# Patient Record
Sex: MALE | Race: BLACK OR AFRICAN AMERICAN
[De-identification: names, ages, dates, MRNs, and addresses within clinical notes are randomized per-mention and may not be internally consistent; named-entity substitution may affect disease eponyms.]

---

## 2019-06-11 ENCOUNTER — HOSPITAL ENCOUNTER (INPATIENT)
Dept: HOSPITAL 72 - EMR | Age: 59
LOS: 3 days | Discharge: NURSING, REHAB OR LONG-TERM CARE | DRG: 602 | End: 2019-06-14
Payer: MEDICARE

## 2019-06-11 VITALS — BODY MASS INDEX: 26.96 KG/M2 | WEIGHT: 182 LBS | HEIGHT: 69 IN

## 2019-06-11 VITALS — SYSTOLIC BLOOD PRESSURE: 166 MMHG | DIASTOLIC BLOOD PRESSURE: 100 MMHG

## 2019-06-11 VITALS — DIASTOLIC BLOOD PRESSURE: 101 MMHG | SYSTOLIC BLOOD PRESSURE: 176 MMHG

## 2019-06-11 VITALS — SYSTOLIC BLOOD PRESSURE: 162 MMHG | DIASTOLIC BLOOD PRESSURE: 100 MMHG

## 2019-06-11 VITALS — DIASTOLIC BLOOD PRESSURE: 94 MMHG | SYSTOLIC BLOOD PRESSURE: 176 MMHG

## 2019-06-11 VITALS — DIASTOLIC BLOOD PRESSURE: 119 MMHG | SYSTOLIC BLOOD PRESSURE: 171 MMHG

## 2019-06-11 VITALS — DIASTOLIC BLOOD PRESSURE: 82 MMHG | SYSTOLIC BLOOD PRESSURE: 146 MMHG

## 2019-06-11 DIAGNOSIS — L03.115: ICD-10-CM

## 2019-06-11 DIAGNOSIS — Z59.0: ICD-10-CM

## 2019-06-11 DIAGNOSIS — E11.21: ICD-10-CM

## 2019-06-11 DIAGNOSIS — I11.0: ICD-10-CM

## 2019-06-11 DIAGNOSIS — F20.9: ICD-10-CM

## 2019-06-11 DIAGNOSIS — L03.116: Primary | ICD-10-CM

## 2019-06-11 DIAGNOSIS — E83.51: ICD-10-CM

## 2019-06-11 DIAGNOSIS — G93.41: ICD-10-CM

## 2019-06-11 DIAGNOSIS — F29: ICD-10-CM

## 2019-06-11 DIAGNOSIS — I51.4: ICD-10-CM

## 2019-06-11 DIAGNOSIS — N40.0: ICD-10-CM

## 2019-06-11 DIAGNOSIS — D50.9: ICD-10-CM

## 2019-06-11 DIAGNOSIS — E83.42: ICD-10-CM

## 2019-06-11 DIAGNOSIS — I21.A1: ICD-10-CM

## 2019-06-11 DIAGNOSIS — N19: ICD-10-CM

## 2019-06-11 DIAGNOSIS — I50.41: ICD-10-CM

## 2019-06-11 LAB
% IRON SATURATION: 14 % (ref 15–50)
ADD MANUAL DIFF: NO
ALBUMIN SERPL-MCNC: 3.1 G/DL (ref 3.4–5)
ALBUMIN/GLOB SERPL: 1.1 {RATIO} (ref 1–2.7)
ALP SERPL-CCNC: 82 U/L (ref 46–116)
ALT SERPL-CCNC: 90 U/L (ref 12–78)
ANION GAP SERPL CALC-SCNC: 8 MMOL/L (ref 5–15)
APPEARANCE UR: CLEAR
APTT BLD: 25 SEC (ref 23–33)
APTT PPP: YELLOW S
AST SERPL-CCNC: 49 U/L (ref 15–37)
BASOPHILS NFR BLD AUTO: 0.8 % (ref 0–2)
BILIRUB SERPL-MCNC: 0.5 MG/DL (ref 0.2–1)
BUN SERPL-MCNC: 31 MG/DL (ref 7–18)
CALCIUM SERPL-MCNC: 8.4 MG/DL (ref 8.5–10.1)
CHLORIDE SERPL-SCNC: 105 MMOL/L (ref 98–107)
CK SERPL-CCNC: 363 U/L (ref 26–308)
CO2 SERPL-SCNC: 25 MMOL/L (ref 21–32)
CREAT SERPL-MCNC: 1.4 MG/DL (ref 0.55–1.3)
EOSINOPHIL NFR BLD AUTO: 0.9 % (ref 0–3)
ERYTHROCYTE [DISTWIDTH] IN BLOOD BY AUTOMATED COUNT: 13.6 % (ref 11.6–14.8)
FERRITIN SERPL-MCNC: 65 NG/ML (ref 8–388)
GLOBULIN SER-MCNC: 2.9 G/DL
GLUCOSE UR STRIP-MCNC: NEGATIVE MG/DL
HCT VFR BLD CALC: 37.1 % (ref 42–52)
HGB BLD-MCNC: 12.2 G/DL (ref 14.2–18)
INR PPP: 1 (ref 0.9–1.1)
IRON SERPL-MCNC: 43 UG/DL (ref 50–175)
KETONES UR QL STRIP: NEGATIVE
LEUKOCYTE ESTERASE UR QL STRIP: NEGATIVE
LYMPHOCYTES NFR BLD AUTO: 13.4 % (ref 20–45)
MCV RBC AUTO: 92 FL (ref 80–99)
MONOCYTES NFR BLD AUTO: 7.5 % (ref 1–10)
NEUTROPHILS NFR BLD AUTO: 77.4 % (ref 45–75)
NITRITE UR QL STRIP: NEGATIVE
PH UR STRIP: 5 [PH] (ref 4.5–8)
PLATELET # BLD: 241 K/UL (ref 150–450)
POTASSIUM SERPL-SCNC: 3.2 MMOL/L (ref 3.5–5.1)
PROT UR QL STRIP: (no result)
RBC # BLD AUTO: 4.04 M/UL (ref 4.7–6.1)
SODIUM SERPL-SCNC: 138 MMOL/L (ref 136–145)
SP GR UR STRIP: 1.02 (ref 1–1.03)
TIBC SERPL-MCNC: 297 UG/DL (ref 250–450)
UNSATURATED IRON BINDING: 254 UG/DL (ref 112–346)
UROBILINOGEN UR-MCNC: NORMAL MG/DL (ref 0–1)
WBC # BLD AUTO: 8.7 K/UL (ref 4.8–10.8)

## 2019-06-11 PROCEDURE — 94664 DEMO&/EVAL PT USE INHALER: CPT

## 2019-06-11 PROCEDURE — 83540 ASSAY OF IRON: CPT

## 2019-06-11 PROCEDURE — 83735 ASSAY OF MAGNESIUM: CPT

## 2019-06-11 PROCEDURE — 86140 C-REACTIVE PROTEIN: CPT

## 2019-06-11 PROCEDURE — 71045 X-RAY EXAM CHEST 1 VIEW: CPT

## 2019-06-11 PROCEDURE — 96376 TX/PRO/DX INJ SAME DRUG ADON: CPT

## 2019-06-11 PROCEDURE — 36415 COLL VENOUS BLD VENIPUNCTURE: CPT

## 2019-06-11 PROCEDURE — 85025 COMPLETE CBC W/AUTO DIFF WBC: CPT

## 2019-06-11 PROCEDURE — 80329 ANALGESICS NON-OPIOID 1 OR 2: CPT

## 2019-06-11 PROCEDURE — 94640 AIRWAY INHALATION TREATMENT: CPT

## 2019-06-11 PROCEDURE — 82977 ASSAY OF GGT: CPT

## 2019-06-11 PROCEDURE — 82607 VITAMIN B-12: CPT

## 2019-06-11 PROCEDURE — 80061 LIPID PANEL: CPT

## 2019-06-11 PROCEDURE — 82728 ASSAY OF FERRITIN: CPT

## 2019-06-11 PROCEDURE — 81001 URINALYSIS AUTO W/SCOPE: CPT

## 2019-06-11 PROCEDURE — 85610 PROTHROMBIN TIME: CPT

## 2019-06-11 PROCEDURE — 85379 FIBRIN DEGRADATION QUANT: CPT

## 2019-06-11 PROCEDURE — 82746 ASSAY OF FOLIC ACID SERUM: CPT

## 2019-06-11 PROCEDURE — 93970 EXTREMITY STUDY: CPT

## 2019-06-11 PROCEDURE — 93925 LOWER EXTREMITY STUDY: CPT

## 2019-06-11 PROCEDURE — 83880 ASSAY OF NATRIURETIC PEPTIDE: CPT

## 2019-06-11 PROCEDURE — 93005 ELECTROCARDIOGRAM TRACING: CPT

## 2019-06-11 PROCEDURE — 70450 CT HEAD/BRAIN W/O DYE: CPT

## 2019-06-11 PROCEDURE — 82140 ASSAY OF AMMONIA: CPT

## 2019-06-11 PROCEDURE — 80053 COMPREHEN METABOLIC PANEL: CPT

## 2019-06-11 PROCEDURE — 84100 ASSAY OF PHOSPHORUS: CPT

## 2019-06-11 PROCEDURE — 84484 ASSAY OF TROPONIN QUANT: CPT

## 2019-06-11 PROCEDURE — 96374 THER/PROPH/DIAG INJ IV PUSH: CPT

## 2019-06-11 PROCEDURE — 83036 HEMOGLOBIN GLYCOSYLATED A1C: CPT

## 2019-06-11 PROCEDURE — 93306 TTE W/DOPPLER COMPLETE: CPT

## 2019-06-11 PROCEDURE — 85730 THROMBOPLASTIN TIME PARTIAL: CPT

## 2019-06-11 PROCEDURE — 80162 ASSAY OF DIGOXIN TOTAL: CPT

## 2019-06-11 PROCEDURE — 99285 EMERGENCY DEPT VISIT HI MDM: CPT

## 2019-06-11 PROCEDURE — 96375 TX/PRO/DX INJ NEW DRUG ADDON: CPT

## 2019-06-11 PROCEDURE — 87081 CULTURE SCREEN ONLY: CPT

## 2019-06-11 PROCEDURE — 84443 ASSAY THYROID STIM HORMONE: CPT

## 2019-06-11 PROCEDURE — 82550 ASSAY OF CK (CPK): CPT

## 2019-06-11 PROCEDURE — 84550 ASSAY OF BLOOD/URIC ACID: CPT

## 2019-06-11 PROCEDURE — 80307 DRUG TEST PRSMV CHEM ANLYZR: CPT

## 2019-06-11 PROCEDURE — 83550 IRON BINDING TEST: CPT

## 2019-06-11 RX ADMIN — TAMSULOSIN HYDROCHLORIDE SCH MG: 0.4 CAPSULE ORAL at 22:16

## 2019-06-11 RX ADMIN — NITROGLYCERIN SCH PATCH: 0.4 PATCH TRANSDERMAL at 20:58

## 2019-06-11 RX ADMIN — IPRATROPIUM BROMIDE AND ALBUTEROL SULFATE PRN ML: .5; 3 SOLUTION RESPIRATORY (INHALATION) at 19:49

## 2019-06-11 SDOH — ECONOMIC STABILITY - HOUSING INSECURITY: HOMELESSNESS: Z59.0

## 2019-06-11 NOTE — NUR
NURSE NOTES:

Patient in bed AAO X1-2 with bouts of confusion, HOB elevated at semi fowlers. No complaints 
of acute pain or discomfort at this time. Kept clean, dry, and comfortable in bed. IV line 
intact and patent SL. Placed on continuous cardiac monitoring per protocol. Offered urinal 
at bedside and changed PRN when soiled. Safety precaution in place; siderails X3 up, call 
light within reach, bed in lowest position, brakes and alarm on at all times. Needs and 
wants anticipated and attended, will continue plan of care and monitor for any changes 
noted.

## 2019-06-11 NOTE — NUR
ED Nurse Note:



g 20 iv resinserted to pt left upper forearm with good blood return and pt able 
to tolerate. will continue to monitor.

## 2019-06-11 NOTE — EMERGENCY ROOM REPORT
History of Present Illness


General


Chief Complaint:  Pain


Source:  Patient


 (Zoltan Palmer)





Present Illness


HPI


58-year-old male with unknown past medical history brought in by the paramedics 

due to screaming of bilateral leg pain in the middle of the street patient is 

homeless.  Patient is having extra movements, screaming, and is not a good 

historian.  Patient replies yes to questions such as pain in which leg and he 

points to left more than right.  Patient denies any other past medical history 

and denies any medication intake.  Denies drug use, alcohol intake.  Patient is 

rating the pain 10 out of 10 with radiation to the calf.  Has not taken any 

medication for pain.  No other past medical history was obtained due to patient 

being a poor historian with possible underlying psychiatric disorder.


 (Zoltan Palmer)


Allergies:  


Coded Allergies:  


     No Known Allergies (Unverified , 2/2/18)





Patient History


Past Medical History:  see triage record


Past Surgical History:  unable to obtain


Pertinent Family History:  unable to obtain


Immunizations:  other - Unknown due to homeless status and patient is not a 

good historian


Reviewed Nursing Documentation:  PMH: Agreed; PSxH: Agreed (Zoltan Palmer)





Nursing Documentation-PMH


Past Medical History:  No History, Except For


History Of Psychiatric Problem:  Yes - Schizo


 (Zoltan Palmer)





Review of Systems


All Other Systems:  negative except mentioned in HPI


 (Zoltan Palmer)





Physical Exam





Vital Signs








  Date Time  Temp Pulse Resp B/P (MAP) Pulse Ox O2 Delivery O2 Flow Rate FiO2


 


6/11/19 12:30 97.2 120 20 158/75 (102) 98 Room Air  








Sp02 EP Interpretation:  reviewed, normal


General Appearance:  moderate distress, other - Patient is very loud moving 

around


Head:  normocephalic, atraumatic


Eyes:  bilateral eye normal inspection, bilateral eye PERRL


ENT:  normal ENT inspection


Neck:  normal inspection, full range of motion, supple


Respiratory:  normal inspection, chest non-tender, lungs clear, no wheezing


Cardiovascular #1:  normal inspection, regular rate, rhythm, no murmur


Cardiovascular #2:  1+ dorsalis pedis (R), 1+ dorsalis pedis (L)


Gastrointestinal:  normal inspection, non tender, soft, no bruit


Rectal:  deferred


Genitourinary:  no CVA tenderness


Neurologic:  normal inspection, alert, oriented x3, responsive


Psychiatric:  normal inspection, judgement/insight normal


Skin:  rash - celulitis both lower extermities


Lymphatic:  normal inspection, no adenopathy


 (Zoltan Palmer)





Medical Decision Making


PA Attestation


All my diagnosis and treatment plans were reviewed ad discussed with my 

supervising physician Dr. Bonilla


 (Zoltan Palmer)


Medicare Attestation


The history of Shiv Grigsby has been reviewed and management options for him 

have been examined and discussed by myself, Ji Celis. I have personally 

examined and interviewed the patient.


I do agree with the work-up and evaluation patient has multiple comorbidities 

and requires further inpatient care








 (Ji Celis DO)


Diagnostic Impression:  


 Primary Impression:  


 CHF (congestive heart failure)


 Additional Impressions:  


 Pleural effusion


 Pedal edema


 Cellulitis


ER Course


58-year-old male with unknown past medical history brought in by the paramedics 

due to screaming of bilateral leg pain in the middle of the street patient is 

homeless.  Patient is having extra movements, screaming, and is not a good 

historian.  Patient replies yes to questions such as pain in which leg and he 

points to left more than right.  Patient denies any other past medical history 

and denies any medication intake.  Denies drug use, alcohol intake.  Patient is 

rating the pain 10 out of 10 with radiation to the calf.  Has not taken any 

medication for pain.  No other past medical history was obtained due to patient 

being a poor historian with possible underlying psychiatric disorder.





Ddx considered but are not limited to: MI, Angina, COPD, GERD, CHF, pleural 

effusion, cellulitis legs














Vital signs: are WNL, pt. is afebrile








 H&PE are most consistent with CHF, pleural effusion, cellulitis legs

















ORDERS: EKG, Chest XR, cardiac labs(troponin, CBC, CMP, lipid, BNP), tox screen

, 














ED INTERVENTIONS: lasix, albuterol/iprathropieum 




















Patient was admited with diagnosis of   CHF and plural effusion     to Dr. Ly       under supervision of : Lalita








pt stable at time of admission


troponin: 0.06, second trop: 0.065, BNP elevated,


 (Zoltan Palmer)





Chest X-Ray Diagnostic Results


Chest X-Ray Diagnostic Results :  


   Chest X-Ray Ordered:  Yes


   # of Views/Limited/Complete:  1 View


   Indication:  Chest Pain


   EP Interpretation:  Yes


   PA Xray:  Interpretation reviewed, by supervising MD, and agrees with 

findings.


   Interpretation:  other - effusion a d CHF


   Impression:  Other - effusion and CHF


   Electronically Signed by:  zoltan wiseman PA-C


 (Zoltan Palmer)





CT/MRI/US Diagnostic Results


CT/MRI/US Diagnostic Results #1:  


   Imaging Test Ordered:  venus US


   Impression


no DVT


CT/MRI/US Diagnostic Results #2:  


   Imaging Test Ordered:  arterial dulpxes


   Impression


Iliac artery stenosis discussed with the radiologist and Dr. Guille Interiano


 (Zoltan Palmer)





Last Vital Signs








  Date Time  Temp Pulse Resp B/P (MAP) Pulse Ox O2 Delivery O2 Flow Rate FiO2


 


6/11/19 12:41 98.1 114 18 146/82 100 Room Air  








 (Zoltan Palmer)


Disposition:  ADMITTED AS INPATIENT


Referrals:  


NOT CHOSEN IPA/MD,REFERRING (PCP)











Zoltan Palmer Jun 11, 2019 13:50


Ji Celis DO Jun 11, 2019 15:16

## 2019-06-11 NOTE — NUR
ED Nurse Note:



recieved pt from fast track for positive troponin, pt initially came to the ed 
for bilateral lower extremity pain and swelling, [facility] duplex,. pt hooked on monitor with bp and hr 
elevted and recorder. will continue to monitor.

## 2019-06-11 NOTE — NUR
-------------------------------------------------------------------------------

            *** Note undone in EDM - 06/11/19 at 1331 by LEILANI ***             

-------------------------------------------------------------------------------

ED Nurse Note:



PT BROUGHT IN TO ER TODAY BY [id number] FROM [facility]RE[address]. AOX4. PT C/O LEFT SIDED LEG 
PAIN, 10/10 X THIS MORNING. PT DENIES ANY RECENT TRAUMA OR INJURY. PT RESTLESS 
AND IRRITATED. CIRCULATION AND SENSATION INTACT, CAP REFILL <3 SECONDS, MUSCLE 
STRENGTH 5/5. PT PRESENTS WITH SWELLING TO LEFT LEG.

## 2019-06-11 NOTE — DIAGNOSTIC IMAGING REPORT
Indication:Leg pain and swelling

 

Technique: Grayscale and duplex Doppler imaging of the veins in both lower

extremities performed in real time utilizing compression and augmentation.

 

Comparison: None

 

Findings:

 

Duplex Doppler interrogation of the veins in both lower extremity is performed from

the common femoral vein to the popliteal vein. Normal venous compressibility

demonstrated throughout. No thrombus identified. Waveform analysis shows good

respiratory phasicity and augmentation.

 

IMPRESSION:

 

No evidence of deep venous thrombosis involving the lower extremities.

## 2019-06-11 NOTE — DIAGNOSTIC IMAGING REPORT
Indication: Dyspnea

 

Comparison:  None

 

A single view chest radiograph was obtained.

 

Findings:

 

Pulmonary vascular congestion demonstrated with cardiomegaly. Bilateral pleural

effusion suspected. Bones are unremarkable.

 

IMPRESSION:

 

CHF and bilateral pleural effusions

## 2019-06-11 NOTE — NUR
ED Nurse Note:



pt was transfered to tele by rn, all belongings endorsed eva sarabia, pt left the 
ed with stable vs.

## 2019-06-11 NOTE — NUR
ED Nurse Note:



pt noted to have expiratory wheezing, justin rodríguez aware and ordered breathing 
treatment. will continue to monitor

## 2019-06-11 NOTE — NUR
NURSE NOTES:

Initiated to re insert new IV access; patient unable to sit still and is uncooperative. CN 
aware and will try again later on. Will continue to monitor

## 2019-06-11 NOTE — NUR
NURSE NOTES:

Patient came on the floor. In RA. AOx2. Denies pain. Belongings checked signed and filed. 
Patient changed into floor gown and cardiac monitor applied. VS recorded. Informed Dr. Kirk. No inpatient document from ER. Bed on lowest position, side rails upx2, brakes 
engaged. Call light within easy reach. Orientation given to patient regarding hospital 
rules.

## 2019-06-11 NOTE — NUR
ED Nurse Note:



PT BROUGHT IN TO ER TODAY BY [id number] FROM [facility]. AOX4. PT C/O BILATERAL LEG 
PAIN, 10/10 X THIS MORNING. PT DENIES ANY RECENT TRAUMA OR INJURY. PT RESTLESS 
AND IRRITATED. CIRCULATION AND SENSATION INTACT, CAP REFILL <3 SECONDS, MUSCLE 
STRENGTH 5/5. PT PRESENTS WITH SWELLING TO BILATERAL LOWER LEGS.

## 2019-06-11 NOTE — DIAGNOSTIC IMAGING REPORT
Indication:Leg pain and swelling

 

Technique: Grayscale and duplex Doppler interrogation of the arteries in both lower

extremities performed in real time.

 

Comparison: None

 

Findings:

 

Right lower extremity

 

Triphasic to biphasic waveforms demonstrated within the visualized right common

femoral artery, superficial femoral artery and popliteal artery. Biphasic to

monophasic waveforms demonstrated within the right posterior tibial artery. Biphasic

waveforms demonstrated within the right dorsalis pedis artery and anterior tibial

artery. No high-grade stenosis identified.

 

Left lower extremity:

 

 Waveform analysis shows abnormal monophasic waveforms some showing tardus parvus

type slow upstroke. This is seen diffusely within the left lower extremity arteries

including common femoral artery, superficial femoral artery popliteal artery and

trifurcation vessels. There is no visualized stenosis or occlusion within these

vessels. However the waveforms suggests upstream stenosis, likely at the level of the

left common or external iliac artery (given that the waveforms in the right lower

extremity runoff are normal). The iliac arteries are not able to be visualized on

this exam.

 

IMPRESSION:

 

High suspicion of significant left iliac artery stenosis given abnormal monophasic

waveforms in the left lower extremity runoff. No visualized high-grade stenosis or

occlusion of the runoff vessels from the left common femoral artery and beyond.

 

No significant stenosis identified from the right common femoral artery to the

trifurcation vessels.

 

Findings discussed with Dr. Ji Celis via telephone

## 2019-06-12 VITALS — SYSTOLIC BLOOD PRESSURE: 156 MMHG | DIASTOLIC BLOOD PRESSURE: 115 MMHG

## 2019-06-12 VITALS — DIASTOLIC BLOOD PRESSURE: 80 MMHG | SYSTOLIC BLOOD PRESSURE: 131 MMHG

## 2019-06-12 VITALS — SYSTOLIC BLOOD PRESSURE: 147 MMHG | DIASTOLIC BLOOD PRESSURE: 123 MMHG

## 2019-06-12 VITALS — SYSTOLIC BLOOD PRESSURE: 147 MMHG | DIASTOLIC BLOOD PRESSURE: 97 MMHG

## 2019-06-12 VITALS — SYSTOLIC BLOOD PRESSURE: 120 MMHG | DIASTOLIC BLOOD PRESSURE: 71 MMHG

## 2019-06-12 VITALS — DIASTOLIC BLOOD PRESSURE: 100 MMHG | SYSTOLIC BLOOD PRESSURE: 152 MMHG

## 2019-06-12 LAB
ADD MANUAL DIFF: NO
ALBUMIN SERPL-MCNC: 2.6 G/DL (ref 3.4–5)
ALBUMIN/GLOB SERPL: 1 {RATIO} (ref 1–2.7)
ALP SERPL-CCNC: 65 U/L (ref 46–116)
ALT SERPL-CCNC: 75 U/L (ref 12–78)
ANION GAP SERPL CALC-SCNC: 9 MMOL/L (ref 5–15)
AST SERPL-CCNC: 42 U/L (ref 15–37)
BASOPHILS NFR BLD AUTO: 1.2 % (ref 0–2)
BILIRUB SERPL-MCNC: 0.3 MG/DL (ref 0.2–1)
BUN SERPL-MCNC: 29 MG/DL (ref 7–18)
CALCIUM SERPL-MCNC: 8.2 MG/DL (ref 8.5–10.1)
CHLORIDE SERPL-SCNC: 110 MMOL/L (ref 98–107)
CHOLEST SERPL-MCNC: 112 MG/DL (ref ?–200)
CK SERPL-CCNC: 387 U/L (ref 26–308)
CO2 SERPL-SCNC: 24 MMOL/L (ref 21–32)
CREAT SERPL-MCNC: 1.4 MG/DL (ref 0.55–1.3)
EOSINOPHIL NFR BLD AUTO: 1.2 % (ref 0–3)
ERYTHROCYTE [DISTWIDTH] IN BLOOD BY AUTOMATED COUNT: 13.6 % (ref 11.6–14.8)
GAMMA GLUTAMYL TRANSPEPTIDASE: 80 U/L (ref 5–85)
GLOBULIN SER-MCNC: 2.6 G/DL
HCT VFR BLD CALC: 33.4 % (ref 42–52)
HDLC SERPL-MCNC: 30 MG/DL (ref 40–60)
HGB BLD-MCNC: 10.8 G/DL (ref 14.2–18)
LYMPHOCYTES NFR BLD AUTO: 13.9 % (ref 20–45)
MCV RBC AUTO: 91 FL (ref 80–99)
MONOCYTES NFR BLD AUTO: 8.2 % (ref 1–10)
NEUTROPHILS NFR BLD AUTO: 75.5 % (ref 45–75)
PHOSPHATE SERPL-MCNC: 3.2 MG/DL (ref 2.5–4.9)
PLATELET # BLD: 199 K/UL (ref 150–450)
POTASSIUM SERPL-SCNC: 3.6 MMOL/L (ref 3.5–5.1)
RBC # BLD AUTO: 3.68 M/UL (ref 4.7–6.1)
SODIUM SERPL-SCNC: 142 MMOL/L (ref 136–145)
TRIGL SERPL-MCNC: 42 MG/DL (ref 30–150)
WBC # BLD AUTO: 7.6 K/UL (ref 4.8–10.8)

## 2019-06-12 RX ADMIN — DOCUSATE SODIUM SCH MG: 100 CAPSULE, LIQUID FILLED ORAL at 18:00

## 2019-06-12 RX ADMIN — IPRATROPIUM BROMIDE AND ALBUTEROL SULFATE PRN ML: .5; 3 SOLUTION RESPIRATORY (INHALATION) at 11:03

## 2019-06-12 RX ADMIN — ISOSORBIDE MONONITRATE SCH MG: 30 TABLET, EXTENDED RELEASE ORAL at 17:11

## 2019-06-12 RX ADMIN — HYDRALAZINE HYDROCHLORIDE SCH MG: 10 TABLET ORAL at 23:34

## 2019-06-12 RX ADMIN — BENZTROPINE MESYLATE SCH MG: 1 TABLET ORAL at 20:19

## 2019-06-12 RX ADMIN — DOCUSATE SODIUM SCH MG: 100 CAPSULE, LIQUID FILLED ORAL at 09:53

## 2019-06-12 RX ADMIN — NITROGLYCERIN SCH PATCH: 0.4 PATCH TRANSDERMAL at 20:20

## 2019-06-12 RX ADMIN — DOCUSATE SODIUM SCH MG: 100 CAPSULE, LIQUID FILLED ORAL at 13:41

## 2019-06-12 RX ADMIN — HYDRALAZINE HYDROCHLORIDE SCH MG: 10 TABLET ORAL at 18:00

## 2019-06-12 RX ADMIN — TAMSULOSIN HYDROCHLORIDE SCH MG: 0.4 CAPSULE ORAL at 20:19

## 2019-06-12 NOTE — CARDIOLOGY REPORT
--------------- APPROVED REPORT --------------





EXAM: Two-dimensional and M-mode echocardiogram with Doppler and color 

Doppler.



INDICATION

Congestive Heart Failure 



M-Mode DIMENSIONS 

IVSd0.9 (0.7-1.1cm)Left Atrium (MM)5.2 (1.6-4.0cm)

LVDd6.0 (3.5-5.6cm)Aortic Root2.7 (2.0-3.7cm)

PWd0.9 (0.7-1.1cm)Aortic Cusp Exc.1.9 (1.5-2.0cm)

IVSs1.1 cm

LVDs5.2 (2.5-4.0cm)

PWs1.1 cm





Mild left ventriuclar enlargement .

Global left ventricular hypokinesis with apical akinesis .

Left ventricular ejection fraction estimated to be  20 %.

Mild left ventricular hypertrophy by 2-D.

Medium size circumferential pleural effuion present .

Large pericardial effusion, without cardiac tamponade.

Mild  left atrial enlargement .

Right ventricular chamber sizes is within normal limits.

Focal aortic valve sclerosis with adequate cusp excursion.

Mildly thickened mitral valve leaflets with normal excursion.

Mild mitral annulus and aortic root calcification.

Pulmonic valve not well visualized.

IVC dilated at 2.5 cm with physiologic collapse suggestive of mildly increased RA 

pressure.



A  color flow and spectral Doppler study was performed and revealed:

No  aortic insufficiency .

Mitral inflow velocities indicates possible pseudo normalization pattern implying 

moderately elevated left atrial pressure (Grade II )

Moderate  mitral regurgitation.

Mild to moderate  tricuspid regurgitation.

Tricuspid  systolic velocities suggests peak right ventricular systolic pressure of  

33mmHg.

## 2019-06-12 NOTE — NUR
CASE MANAGEMENT:REVIEW



58 YR OLD MALE BIBA FROM STREET ~ HOMELESS



CC: BLE PAIN AND SWELLING



SI: BLE CELLULITIS. CHF. 

97.2   120  20  158/75  98% ON RA

K-3.2   BUN+31  CR+1.4   AST/ALT+49 /90   TROPONIN(+)0.060





IS: IV ATIVAN X2

IV LASIX X2

DUONEB HHN

CLONIDINE PO

CHEST XRAY

**: TO TELEMETRY 

DIGOXIN PO

IV LASIX QD

COREG PO Q12

HYDRALAZINE PO Q6

ASA PO QD

VASOTEC PO QD

## 2019-06-12 NOTE — NUR
HAND-OFF: 

Report given to BIB Luo RN. Patient on the chair with no complaints of distress at this 
time. Endorsed plan of care.

## 2019-06-12 NOTE — NUR
NURSE NOTES:

pt. off cardiac monitor- went to assess pt. and found cardiac monitor on floor- pt. refusing 
to wear monitor- explained importance of wearing monitor- pt. continues to refuse to wear 
monitor- notified charge nurse and monitor tech.

## 2019-06-12 NOTE — NUR
HAND-OFF: 

Report given to Diego RN. Pt in stable condition.  refuses to keep cardiac monitor on him.

## 2019-06-12 NOTE — CONSULTATION
History of Present Illness


General


Chief Complaint:  Pain





Present Illness


Allergies:  


Coded Allergies:  


     No Known Allergies (Unverified , 2/2/18)





Medication History


Scheduled


No Known Medications* (NKM - No Known Medications*), 0 ., (Reported)





Scheduled PRN


Ibuprofen* (Motrin*), 600 MG ORAL Q6H PRN for For Pain





Patient History


Healthcare decision maker





Resuscitation status





Advanced Directive on File








Physical Exam





Last 24 Hour Vital Signs








  Date Time  Temp Pulse Resp B/P (MAP) Pulse Ox O2 Delivery O2 Flow Rate FiO2


 


6/12/19 13:44    132/79    


 


6/12/19 11:14  116 20  100 Room Air  21


 


6/12/19 11:00  117 22  100 Room Air  21


 


6/12/19 08:00 98.0 110 21 156/115 (129) 99   


 


6/12/19 06:25    147/97    


 


6/12/19 04:00 98.3 112 22 147/97 (114) 99   


 


6/12/19 00:00 97.1 117 20 152/100 (117) 94   


 


6/11/19 22:16    165/95    


 


6/11/19 21:00      Room Air  


 


6/11/19 20:58    176/101    


 


6/11/19 20:00 97.8 117 22 176/101 (126) 94   


 


6/11/19 20:00  118      


 


6/11/19 20:00    170/101    


 


6/11/19 19:49  103 18  98 Room Air  21


 


6/11/19 18:50    171/119    


 


6/11/19 17:23      Room Air  


 


6/11/19 17:00 97.1 116 20 171/119 (136) 99   


 


6/11/19 16:46 98.1 105 18 162/100 98 Room Air  


 


6/11/19 16:29  105 18 162/100 98 Room Air  











Laboratory Tests








Test


  6/12/19


05:25


 


White Blood Count


  7.6 K/UL


(4.8-10.8)


 


Red Blood Count


  3.68 M/UL


(4.70-6.10)  L


 


Hemoglobin


  10.8 G/DL


(14.2-18.0)  L


 


Hematocrit


  33.4 %


(42.0-52.0)  L


 


Mean Corpuscular Volume 91 FL (80-99)  


 


Mean Corpuscular Hemoglobin


  29.5 PG


(27.0-31.0)


 


Mean Corpuscular Hemoglobin


Concent 32.5 G/DL


(32.0-36.0)


 


Red Cell Distribution Width


  13.6 %


(11.6-14.8)


 


Platelet Count


  199 K/UL


(150-450)


 


Mean Platelet Volume


  6.0 FL


(6.5-10.1)  L


 


Neutrophils (%) (Auto)


  75.5 %


(45.0-75.0)  H


 


Lymphocytes (%) (Auto)


  13.9 %


(20.0-45.0)  L


 


Monocytes (%) (Auto)


  8.2 %


(1.0-10.0)


 


Eosinophils (%) (Auto)


  1.2 %


(0.0-3.0)


 


Basophils (%) (Auto)


  1.2 %


(0.0-2.0)


 


Sodium Level


  142 MMOL/L


(136-145)


 


Potassium Level


  3.6 MMOL/L


(3.5-5.1)


 


Chloride Level


  110 MMOL/L


()  H


 


Carbon Dioxide Level


  24 MMOL/L


(21-32)


 


Anion Gap


  9 mmol/L


(5-15)


 


Blood Urea Nitrogen


  29 mg/dL


(7-18)  H


 


Creatinine


  1.4 MG/DL


(0.55-1.30)  H


 


Estimat Glomerular Filtration


Rate > 60 mL/min


(>60)


 


Glucose Level


  125 MG/DL


()  H


 


Hemoglobin A1c


  7.0 %


(4.3-6.0)  H


 


Uric Acid


  6.9 MG/DL


(2.6-7.2)


 


Calcium Level


  8.2 MG/DL


(8.5-10.1)  L


 


Phosphorus Level


  3.2 MG/DL


(2.5-4.9)


 


Magnesium Level


  1.6 MG/DL


(1.8-2.4)  L


 


Total Bilirubin


  0.3 MG/DL


(0.2-1.0)


 


Gamma Glutamyl Transpeptidase 80 U/L (5-85)  


 


Aspartate Amino Transf


(AST/SGOT) 42 U/L (15-37)


H


 


Alanine Aminotransferase


(ALT/SGPT) 75 U/L (12-78)


 


 


Alkaline Phosphatase


  65 U/L


()


 


Ammonia


  44 umol/L


(11-32)  H


 


Total Creatine Kinase


  387 U/L


()  H


 


Troponin I


  0.090 ng/mL


(0.000-0.056)


 


C-Reactive Protein,


Quantitative < 0.4 mg/dL


(0.00-0.90)


 


Pro-B-Type Natriuretic Peptide


  4113 pg/mL


(0-125)  H


 


Total Protein


  5.2 G/DL


(6.4-8.2)  L


 


Albumin


  2.6 G/DL


(3.4-5.0)  L


 


Globulin 2.6 g/dL  


 


Albumin/Globulin Ratio 1.0 (1.0-2.7)  


 


Triglycerides Level


  42 MG/DL


()


 


Cholesterol Level


  112 MG/DL (<


200)


 


LDL Cholesterol


  73 mg/dL


(<100)


 


HDL Cholesterol


  30 MG/DL


(40-60)  L


 


Cholesterol/HDL Ratio 3.7 (3.3-4.4)  


 


Vitamin B12 Level


  421 PG/ML


(193-986)


 


Folate


  16.7 NG/ML


(8.6-58.9)


 


Thyroid Stimulating Hormone


(TSH) 2.555 uiU/mL


(0.358-3.740)








Height (Feet):  5


Height (Inches):  9.00


Weight (Pounds):  182


Medications





Current Medications








 Medications


  (Trade)  Dose


 Ordered  Sig/Nighat


 Route


 PRN Reason  Start Time


 Stop Time Status Last Admin


Dose Admin


 


 Acetaminophen


  (Tylenol)  650 mg  Q4H  PRN


 ORAL


 Mild Pain/Temp > 100.5  6/11/19 19:07


 7/11/19 19:06  6/12/19 13:41


 


 


 Albuterol/


 Ipratropium


  (Albuterol/


 Ipratropium)  3 ml  Q4H  PRN


 HHN


 Shortness of Breath  6/11/19 19:10


 6/16/19 19:09  6/12/19 11:03


 


 


 Aspirin


  (ASA)  325 mg  DAILY


 ORAL


   6/12/19 15:00


 7/12/19 14:59   


 


 


 Clonidine HCl


  (Catapres tab)  0.2 mg  EVERY 8  HOURS


 ORAL


   6/12/19 14:00


 7/11/19 21:59  6/12/19 13:44


 


 


 Digoxin


  (Lanoxin)  0.25 mg  DAILY


 ORAL


   6/12/19 14:45


 7/12/19 14:44   


 


 


 Docusate Sodium


  (Colace)  100 mg  THREE TIMES A  DAY


 ORAL


   6/12/19 09:00


 7/12/19 08:59  6/12/19 13:41


 


 


 Famotidine


  (Pepcid)  20 mg  Q12HR


 ORAL


   6/11/19 21:00


 7/11/19 20:59  6/12/19 09:53


 


 


 Gabapentin


  (Neurontin)  100 mg  THREE TIMES A  DAY


 ORAL


   6/12/19 14:45


 7/12/19 14:44   


 


 


 Hydralazine HCl


  (Apresoline)  10 mg  Q6HR


 ORAL


   6/12/19 18:00


 7/12/19 17:59   


 


 


 Hydralazine HCl


  (Apresoline)  25 mg  Q4H  PRN


 ORAL


 bp over 160syst  6/11/19 19:03


 7/11/19 19:02   


 


 


 Iron Sucrose 200


 mg/Sodium Chloride  120 ml @ 


 240 mls/hr  ONCE


 IV


   6/12/19 16:00


 6/12/19 18:00   


 


 


 Isosorbide


 Mononitrate


  (Imdur)  30 mg  DAILY


 ORAL


   6/12/19 14:49


 7/12/19 14:48   


 


 


 Nitroglycerin


  (Ntg)  1 patch  Q24H


 TDERMAL


   6/11/19 20:00


 7/11/19 19:59  6/11/19 20:58


 


 


 Potassium Chloride


  (K-Dur)  40 meq  Q12HR


 ORAL


   6/11/19 21:00


 7/11/19 20:59  6/12/19 09:53


 


 


 Risperidone


  (RisperDAL)  2 mg  QHS


 ORAL


   6/11/19 21:00


 7/11/19 20:59  6/11/19 22:16


 


 


 Tamsulosin HCl


  (Flomax)  0.4 mg  BEDTIME


 ORAL


   6/11/19 21:00


 7/11/19 20:59  6/11/19 22:16


 


 


 Tramadol HCl


  (Ultram)  25 mg  Q6H  PRN


 ORAL


 pain 6 and above  6/11/19 19:07


 6/18/19 19:06   


 











Assessment/Plan


Assessment/Plan:


Hematology Consultation





Date patient seen:  Jun 12, 2019


Reason for Hospitalization:  lower extremity cellulitis


RFC: Anemia rolly CASTELLANOS MD: Ji TOMPKINS


This is a 58-year-old male who was brought in by the paramedics due to 

screaming of bilateral leg pain in the middle of the street.  Patient homeless 

and with psych history.  c/o pain in bilateral calfs and tender on palpation.  

concerns for possible cellulitis given skin changes. surgery called to evaluate 

and assist with care. patient seen, chart reviewed, patient examined. since 

admission states better, currently does not need intervention, not to have 

anemia and heme was consulted.





Allergies:  


     No Known Allergies (Unverified , 2/2/18)





Meds


No Known Medications* (NKM - No Known Medications*), 0 ., (Reported)





Scheduled PRN


Ibuprofen* (Motrin*), 600 MG ORAL Q6H PRN for For Pain





Patient History


Limited by:  medical condition


History Provided By:  Patient, Medical Record, PMD


Healthcare decision maker





Resuscitation status





Advanced Directive on File





Past Medical/Surgical History


Past Medical/Surgical History:  


(1) Cellulitis


(2) CHF (congestive heart failure)


(3) Pleural effusion


(4) Pedal edema





ROS:


Constitutional: No fever, no chills, no night sweats, no fatigue


Skin: No rashes, lumps, itchiness, dryness


HEENT: No HA, ear ache, visual changes


Breasts: No lumps, pain, discharge


Pulmonary: No cough, sputum, shortness of breath, coughing up blood


Cardiovascular: No chest pain, tightness, palpitations, syncope, PND


GI: No nausea, vomiting, diarrhea, melena


: No dysuria, frequency, urgency


Musculoskeletal: No joint swelling


Neurologic: No dizziness, fainting, seizures


Psychiatric: No nervousness, stress


Endocrine: No weight change





Physical Exam


General:  alert, cooperative, no distress


Head:  Normocephalic, without obvious abnormality, atraumatic


Eyes:  conjunctivae/corneas clear. PERRL


Throat:  Lips, mucosa, and tongue normal.


Neck:  supple, symmetrical, trachea midline, no adenopathy


Lungs:  clear to auscultation bilaterally


Heart:  regular rate and rhythm, S1, S2 normal


Abdomen:  soft, non-tender. Bowel sounds normal


Extremities:  extremities normal, atraumatic, no cyanosis or edema


Pulses:  2+ and symmetric


Skin:  Skin color, texture, turgor normal. No rashes or lesions


Neurologic:  Grossly normal





Last 24 Hour Vital Signs








  Date Time  Temp Pulse Resp B/P (MAP) Pulse Ox O2 Delivery O2 Flow Rate FiO2


 


6/12/19 13:44    132/79    


 


6/12/19 11:14  116 20  100 Room Air  21


 


6/12/19 11:00  117 22  100 Room Air  21


 


6/12/19 08:00 98.0 110 21 156/115 (129) 99   


 


6/12/19 06:25    147/97    


 


6/12/19 04:00 98.3 112 22 147/97 (114) 99   


 


6/12/19 00:00 97.1 117 20 152/100 (117) 94   


 


6/11/19 22:16    165/95    


 


6/11/19 21:00      Room Air  


 


6/11/19 20:58    176/101    


 


6/11/19 20:00 97.8 117 22 176/101 (126) 94   


 


6/11/19 20:00  118      


 


6/11/19 20:00    170/101    


 


6/11/19 19:49  103 18  98 Room Air  21


 


6/11/19 18:50    171/119    


 


6/11/19 17:23      Room Air  


 


6/11/19 17:00 97.1 116 20 171/119 (136) 99   


 


6/11/19 16:46 98.1 105 18 162/100 98 Room Air  


 


6/11/19 16:29  105 18 162/100 98 Room Air  


 


6/11/19 15:23  110 20 166/100 92 Room Air  











Laboratory Tests








Test


  6/12/19


05:25


 


White Blood Count


  7.6 K/UL


(4.8-10.8)


 


Red Blood Count


  3.68 M/UL


(4.70-6.10)  L


 


Hemoglobin


  10.8 G/DL


(14.2-18.0)  L


 


Hematocrit


  33.4 %


(42.0-52.0)  L


 


Mean Corpuscular Volume 91 FL (80-99)  


 


Mean Corpuscular Hemoglobin


  29.5 PG


(27.0-31.0)


 


Mean Corpuscular Hemoglobin


Concent 32.5 G/DL


(32.0-36.0)


 


Red Cell Distribution Width


  13.6 %


(11.6-14.8)


 


Platelet Count


  199 K/UL


(150-450)


 


Mean Platelet Volume


  6.0 FL


(6.5-10.1)  L


 


Neutrophils (%) (Auto)


  75.5 %


(45.0-75.0)  H


 


Lymphocytes (%) (Auto)


  13.9 %


(20.0-45.0)  L


 


Monocytes (%) (Auto)


  8.2 %


(1.0-10.0)


 


Eosinophils (%) (Auto)


  1.2 %


(0.0-3.0)


 


Basophils (%) (Auto)


  1.2 %


(0.0-2.0)


 


Sodium Level


  142 MMOL/L


(136-145)


 


Potassium Level


  3.6 MMOL/L


(3.5-5.1)


 


Chloride Level


  110 MMOL/L


()  H


 


Carbon Dioxide Level


  24 MMOL/L


(21-32)


 


Anion Gap


  9 mmol/L


(5-15)


 


Blood Urea Nitrogen


  29 mg/dL


(7-18)  H


 


Creatinine


  1.4 MG/DL


(0.55-1.30)  H


 


Estimat Glomerular Filtration


Rate > 60 mL/min


(>60)


 


Glucose Level


  125 MG/DL


()  H


 


Hemoglobin A1c


  7.0 %


(4.3-6.0)  H


 


Uric Acid


  6.9 MG/DL


(2.6-7.2)


 


Calcium Level


  8.2 MG/DL


(8.5-10.1)  L


 


Phosphorus Level


  3.2 MG/DL


(2.5-4.9)


 


Magnesium Level


  1.6 MG/DL


(1.8-2.4)  L


 


Total Bilirubin


  0.3 MG/DL


(0.2-1.0)


 


Gamma Glutamyl Transpeptidase 80 U/L (5-85)  


 


Aspartate Amino Transf


(AST/SGOT) 42 U/L (15-37)


H


 


Alanine Aminotransferase


(ALT/SGPT) 75 U/L (12-78)


 


 


Alkaline Phosphatase


  65 U/L


()


 


Ammonia


  44 umol/L


(11-32)  H


 


Total Creatine Kinase


  387 U/L


()  H


 


Troponin I


  0.090 ng/mL


(0.000-0.056)


 


C-Reactive Protein,


Quantitative < 0.4 mg/dL


(0.00-0.90)


 


Pro-B-Type Natriuretic Peptide


  4113 pg/mL


(0-125)  H


 


Total Protein


  5.2 G/DL


(6.4-8.2)  L


 


Albumin


  2.6 G/DL


(3.4-5.0)  L


 


Globulin 2.6 g/dL  


 


Albumin/Globulin Ratio 1.0 (1.0-2.7)  


 


Triglycerides Level


  42 MG/DL


()


 


Cholesterol Level


  112 MG/DL (<


200)


 


LDL Cholesterol


  73 mg/dL


(<100)


 


HDL Cholesterol


  30 MG/DL


(40-60)  L


 


Cholesterol/HDL Ratio 3.7 (3.3-4.4)  


 


Vitamin B12 Level


  421 PG/ML


(193-986)


 


Folate


  16.7 NG/ML


(8.6-58.9)


 


Thyroid Stimulating Hormone


(TSH) 2.555 uiU/mL


(0.358-3.740)








Height (Feet):  5


Height (Inches):  9.00


Weight (Pounds):  182


Medications





Current Medications








 Medications


  (Trade)  Dose


 Ordered  Sig/Nighat


 Route


 PRN Reason  Start Time


 Stop Time Status Last Admin


Dose Admin


 


 Acetaminophen


  (Tylenol)  650 mg  Q4H  PRN


 ORAL


 Mild Pain/Temp > 100.5  6/11/19 19:07


 7/11/19 19:06  6/12/19 13:41


 


 


 Albuterol/


 Ipratropium


  (Albuterol/


 Ipratropium)  3 ml  Q4H  PRN


 HHN


 Shortness of Breath  6/11/19 19:10


 6/16/19 19:09  6/12/19 11:03


 


 


 Aspirin


  (ASA)  325 mg  DAILY


 ORAL


   6/12/19 15:00


 7/12/19 14:59   


 


 


 Clonidine HCl


  (Catapres tab)  0.2 mg  EVERY 8  HOURS


 ORAL


   6/12/19 14:00


 7/11/19 21:59  6/12/19 13:44


 


 


 Digoxin


  (Lanoxin)  0.25 mg  DAILY


 ORAL


   6/12/19 14:45


 7/12/19 14:44   


 


 


 Docusate Sodium


  (Colace)  100 mg  THREE TIMES A  DAY


 ORAL


   6/12/19 09:00


 7/12/19 08:59  6/12/19 13:41


 


 


 Famotidine


  (Pepcid)  20 mg  Q12HR


 ORAL


   6/11/19 21:00


 7/11/19 20:59  6/12/19 09:53


 


 


 Furosemide


  (Lasix)  20 mg  ONCE


 IV


   6/12/19 14:51


 6/12/19 16:00   


 


 


 Gabapentin


  (Neurontin)  100 mg  THREE TIMES A  DAY


 ORAL


   6/12/19 14:45


 7/12/19 14:44   


 


 


 Hydralazine HCl


  (Apresoline)  10 mg  Q6HR


 ORAL


   6/12/19 18:00


 7/12/19 17:59   


 


 


 Hydralazine HCl


  (Apresoline)  25 mg  Q4H  PRN


 ORAL


 bp over 160syst  6/11/19 19:03


 7/11/19 19:02   


 


 


 Iron Sucrose 200


 mg/Sodium Chloride  120 ml @ 


 240 mls/hr  ONCE


 IV


   6/12/19 16:00


 6/12/19 18:00   


 


 


 Isosorbide


 Mononitrate


  (Imdur)  30 mg  DAILY


 ORAL


   6/12/19 14:49


 7/12/19 14:48   


 


 


 Nitroglycerin


  (Ntg)  1 patch  Q24H


 TDERMAL


   6/11/19 20:00


 7/11/19 19:59  6/11/19 20:58


 


 


 Potassium Chloride


  (K-Dur)  40 meq  Q12HR


 ORAL


   6/11/19 21:00


 7/11/19 20:59  6/12/19 09:53


 


 


 Risperidone


  (RisperDAL)  2 mg  QHS


 ORAL


   6/11/19 21:00


 7/11/19 20:59  6/11/19 22:16


 


 


 Tamsulosin HCl


  (Flomax)  0.4 mg  BEDTIME


 ORAL


   6/11/19 21:00


 7/11/19 20:59  6/11/19 22:16


 


 


 Tramadol HCl


  (Ultram)  25 mg  Q6H  PRN


 ORAL


 pain 6 and above  6/11/19 19:07


 6/18/19 19:06   


 








Assessment and Recs:


# Anemia of iron deficiency due to underlying chronic medical issues, 

multifactorial 


--> Anemia workup has been ordered, rule out gi bleed 


--> No evidence of hemolysis is noted, peripheral smear has been reviewed.


--> Hgb goal >7. Transfuse prn.


--> Iron IV has been started x 5 doses total


--> Medications have been reviewed


--> low threshold for gi evaluation in case has occult +


--> gi eval as required


# Cellulitis of bilateral lower extremities. mild edema. no abscess. was tender 

on admission but now improved.  +psych history.


--> abx as per id


--> surgical recs prn


--> skin protectant to both legs


# CHF with pedal edema


--> as per cards


# Pleural effusion 


--> diuresis on prn lasix


# Pedal edema





The timing of this note does not necessarily reflect the time of the patient 

was seen.





GREATLY APPRECIATE CONSULTATION.











Reginaldo Amado MD Jun 12, 2019 16:13

## 2019-06-12 NOTE — NUR
NURSE NOTES:

Received report from Diego HARTMAN, pt. in room sitting in chair watching television, A/O x's 2- 
able to make needs known, no signs or symptoms of acute cardiac or respiratory distress 
noted, bed in lowest position and call light within easy reach, side rails up x's2 and 
safety brakes engaged, pt. appears to be sating well on room air at 98%- no distress noted, 
pt. has RFA 20G IV intact and patent, safety measures continued, will continue with plan of 
care.

## 2019-06-12 NOTE — CONSULTATION
Consult Note


Consult Note





asked to eval at the request of dr somers





58-year-old male with unknown past medical history brought in by the paramedics 

due to screaming of bilateral leg pain in the middle of the street patient is 

homeless.  Patient is having extra movements, screaming, and is not a good 

historian.  Patient replies yes to questions such as pain in which leg and he 

points to left more than right.  Patient denies any other past medical history 

and denies any medication intake.  Denies drug use, alcohol intake.  Patient is 

rating the pain 10 out of 10 with radiation to the calf.  Has not taken any 

medication for pain.  No other past medical history was obtained due to patient 

being a poor historian with possible underlying psychiatric disorder.





examined


data reviewed


Assessment/Plan





HTN OOC


Proteinuria / HypoAlbuminemia / Edematous state


Cardiomyopathy : Low Ej Fx


Diabetes ; High A1c


CHF


Renal failure


Anemia








plan;





Slow diurese


BP control


BS control


Afterload & Preload reduction


Inotropic Rx


monitor lytes and renal parameters











Scott Mccray MD Jun 12, 2019 14:50

## 2019-06-12 NOTE — NUR
NURSE NOTES:

Received  report from Diego HARTMAN.  Pt sitting in chair, cardiac monitor was put back on because 
he does not like it on.  Pt is not showing any kind of cardiac or respiratory distress at 
this time.  Call light within reach.  Bed locked and in lowest position.   Will continue to 
monitor and follow plan of care.

## 2019-06-12 NOTE — NUR
Social Service Note



Patient with a previous ER visit 2/2018.  Patient cleared by psych and ER MD and returned to 
previous living conditions.  SW met with patient to assess for homelessness.  Patient is 
awake and verbally responsive.  Patient is aware he is in the hospital and stated he had 
someone passing by call 911 because his legs were hurting.  Patient states he has been 
homeless for 10 years.  SW contacted missing person [phone] and patient has not been 
reported missing.  Patient stated he lived in a board and care possibly 4 years ago but then 
his SSI stopped and he ended up on the streets.  Patient appears to be a poor historian and 
provides inconsistent information.  Patient unable to recall his SS#.  Patient unable to 
provided previous living location but states he usually just sleeps on the streets.  Patient 
stated he has a mental health disorder but doesn't recall what is was.  Patient states he 
has prior drug history of smoking crack but hasn't done this in years.  Patient was positive 
for THC.  Patient unable to provide an emergency contact.  Patient stated he doesn't want 
shelter placement.  JAZLYN contacted [facility] to determine if he is receiving case management 
services since he indicated someone was following him because he was "mental".  JAZLYN will 
continue to monitor.  Pending psych consultation.

## 2019-06-12 NOTE — NUR
NURSE NOTES:

Received report from BIB Luo RN. Patient in bed AAO X1-2 with bouts of confusion, HOB 
elevated at semi fowlers. No complaints of acute pain or discomfort at this time. Kept 
clean, dry, and comfortable in bed. IV line intact and patent SL. Placed on continuous 
cardiac monitoring per protocol. Offered urinal at bedside and changed PRN when soiled. 
Safety precaution in place; siderails X3 up, call light within reach, bed in lowest 
position, brakes and alarm on at all times. Needs and wants anticipated and attended, will 
continue plan of care and monitor for any changes noted.

## 2019-06-12 NOTE — CONSULTATION
History of Present Illness


General


Date patient seen:  Jun 12, 2019


Reason for Hospitalization:  lower extremity cellulitis





Present Illness


HPI


This is a 58-year-old male who was brought in by the paramedics due to 

screaming of bilateral leg pain in the middle of the street.  Patient homeless 

and with psych history.  c/o pain in bilateral calfs and tender on palpation.  

concerns for possible cellulitis given skin changes. surgery called to evaluate 

and assist with care. patient seen, chart reviewed, patient examined. since 

admission states better


Allergies:  


Coded Allergies:  


     No Known Allergies (Unverified , 2/2/18)





Medication History


Scheduled


No Known Medications* (NKM - No Known Medications*), 0 ., (Reported)





Scheduled PRN


Ibuprofen* (Motrin*), 600 MG ORAL Q6H PRN for For Pain





Patient History


Limited by:  medical condition


History Provided By:  Patient, Medical Record, PMD


Healthcare decision maker





Resuscitation status





Advanced Directive on File








Past Medical/Surgical History


Past Medical/Surgical History:  


(1) Cellulitis


(2) CHF (congestive heart failure)


(3) Pleural effusion


(4) Pedal edema





Review of Systems


Review of Symptoms


General ROS: no weight loss or fever


Psychological ROS: no depression or mood changes, no memory loss


Ophthalmic ROS: no visual changes or eye irritation


ENT ROS: no nasal congestion, hearing loss, dizziness


Allergy and Immunology ROS: no allergic symptoms or urticaria


Hematological and Lymphatic ROS: no swollen glands, unusual bleeding or bruising


Endocrine ROS: no polyuria, polydipsia, weight changes, temperature intolerance


Respiratory ROS: no cough, shortness of breath, or wheezing


Cardiovascular ROS: no chest pain or dyspnea on exertion


Gastrointestinal ROS: denies abdominal pain, no bright red blood in stool.


Musculoskeletal ROS:  myalgias lower extremities 


Neurological ROS: no TIA or stroke symptoms


Dermatological ROS: no new or changing skin lesions, rashes or pruritis





Physical Exam


Physical Exam


General appearance:  alert, cooperative, no distress, appears stated age


Head:  Normocephalic, without obvious abnormality, atraumatic


Eyes:  conjunctivae/corneas clear. PERRL, EOM's intact. Fundi benign


Throat:  Lips, mucosa, and tongue normal. Teeth and gums normal


Neck:  supple, symmetrical, trachea midline, no adenopathy, thyroid: not 

enlarged, symmetric, no tenderness/mass/nodules, no carotid bruit and no JVD


Lungs:  clear to auscultation bilaterally


Heart:  regular rate and rhythm, S1, S2 normal, no murmur, click, rub or gallop


Abdomen:  soft, non-tender. Bowel sounds normal. No masses,  no organomegaly


Extremities:  extremities normal, atraumatic, no cyanosis or edema


Pulses:  2+ and symmetric


Skin:  Skin color, texture, turgor normal. No rashes or lesions.  skin changes 

chronic to bilateral lower extremities.  no abscess noted.  unlikely 

cellulitis.  


Neurologic:  Grossly normal





Last 24 Hour Vital Signs








  Date Time  Temp Pulse Resp B/P (MAP) Pulse Ox O2 Delivery O2 Flow Rate FiO2


 


6/12/19 13:44    132/79    


 


6/12/19 11:14  116 20  100 Room Air  21


 


6/12/19 11:00  117 22  100 Room Air  21


 


6/12/19 08:00 98.0 110 21 156/115 (129) 99   


 


6/12/19 06:25    147/97    


 


6/12/19 04:00 98.3 112 22 147/97 (114) 99   


 


6/12/19 00:00 97.1 117 20 152/100 (117) 94   


 


6/11/19 22:16    165/95    


 


6/11/19 21:00      Room Air  


 


6/11/19 20:58    176/101    


 


6/11/19 20:00 97.8 117 22 176/101 (126) 94   


 


6/11/19 20:00  118      


 


6/11/19 20:00    170/101    


 


6/11/19 19:49  103 18  98 Room Air  21


 


6/11/19 18:50    171/119    


 


6/11/19 17:23      Room Air  


 


6/11/19 17:00 97.1 116 20 171/119 (136) 99   


 


6/11/19 16:46 98.1 105 18 162/100 98 Room Air  


 


6/11/19 16:29  105 18 162/100 98 Room Air  


 


6/11/19 15:23  110 20 166/100 92 Room Air  











Laboratory Tests








Test


  6/12/19


05:25


 


White Blood Count


  7.6 K/UL


(4.8-10.8)


 


Red Blood Count


  3.68 M/UL


(4.70-6.10)  L


 


Hemoglobin


  10.8 G/DL


(14.2-18.0)  L


 


Hematocrit


  33.4 %


(42.0-52.0)  L


 


Mean Corpuscular Volume 91 FL (80-99)  


 


Mean Corpuscular Hemoglobin


  29.5 PG


(27.0-31.0)


 


Mean Corpuscular Hemoglobin


Concent 32.5 G/DL


(32.0-36.0)


 


Red Cell Distribution Width


  13.6 %


(11.6-14.8)


 


Platelet Count


  199 K/UL


(150-450)


 


Mean Platelet Volume


  6.0 FL


(6.5-10.1)  L


 


Neutrophils (%) (Auto)


  75.5 %


(45.0-75.0)  H


 


Lymphocytes (%) (Auto)


  13.9 %


(20.0-45.0)  L


 


Monocytes (%) (Auto)


  8.2 %


(1.0-10.0)


 


Eosinophils (%) (Auto)


  1.2 %


(0.0-3.0)


 


Basophils (%) (Auto)


  1.2 %


(0.0-2.0)


 


Sodium Level


  142 MMOL/L


(136-145)


 


Potassium Level


  3.6 MMOL/L


(3.5-5.1)


 


Chloride Level


  110 MMOL/L


()  H


 


Carbon Dioxide Level


  24 MMOL/L


(21-32)


 


Anion Gap


  9 mmol/L


(5-15)


 


Blood Urea Nitrogen


  29 mg/dL


(7-18)  H


 


Creatinine


  1.4 MG/DL


(0.55-1.30)  H


 


Estimat Glomerular Filtration


Rate > 60 mL/min


(>60)


 


Glucose Level


  125 MG/DL


()  H


 


Hemoglobin A1c


  7.0 %


(4.3-6.0)  H


 


Uric Acid


  6.9 MG/DL


(2.6-7.2)


 


Calcium Level


  8.2 MG/DL


(8.5-10.1)  L


 


Phosphorus Level


  3.2 MG/DL


(2.5-4.9)


 


Magnesium Level


  1.6 MG/DL


(1.8-2.4)  L


 


Total Bilirubin


  0.3 MG/DL


(0.2-1.0)


 


Gamma Glutamyl Transpeptidase 80 U/L (5-85)  


 


Aspartate Amino Transf


(AST/SGOT) 42 U/L (15-37)


H


 


Alanine Aminotransferase


(ALT/SGPT) 75 U/L (12-78)


 


 


Alkaline Phosphatase


  65 U/L


()


 


Ammonia


  44 umol/L


(11-32)  H


 


Total Creatine Kinase


  387 U/L


()  H


 


Troponin I


  0.090 ng/mL


(0.000-0.056)


 


C-Reactive Protein,


Quantitative < 0.4 mg/dL


(0.00-0.90)


 


Pro-B-Type Natriuretic Peptide


  4113 pg/mL


(0-125)  H


 


Total Protein


  5.2 G/DL


(6.4-8.2)  L


 


Albumin


  2.6 G/DL


(3.4-5.0)  L


 


Globulin 2.6 g/dL  


 


Albumin/Globulin Ratio 1.0 (1.0-2.7)  


 


Triglycerides Level


  42 MG/DL


()


 


Cholesterol Level


  112 MG/DL (<


200)


 


LDL Cholesterol


  73 mg/dL


(<100)


 


HDL Cholesterol


  30 MG/DL


(40-60)  L


 


Cholesterol/HDL Ratio 3.7 (3.3-4.4)  


 


Vitamin B12 Level


  421 PG/ML


(193-986)


 


Folate


  16.7 NG/ML


(8.6-58.9)


 


Thyroid Stimulating Hormone


(TSH) 2.555 uiU/mL


(0.358-3.740)








Height (Feet):  5


Height (Inches):  9.00


Weight (Pounds):  182


Medications





Current Medications








 Medications


  (Trade)  Dose


 Ordered  Sig/Nighat


 Route


 PRN Reason  Start Time


 Stop Time Status Last Admin


Dose Admin


 


 Acetaminophen


  (Tylenol)  650 mg  Q4H  PRN


 ORAL


 Mild Pain/Temp > 100.5  6/11/19 19:07


 7/11/19 19:06  6/12/19 13:41


 


 


 Albuterol/


 Ipratropium


  (Albuterol/


 Ipratropium)  3 ml  Q4H  PRN


 HHN


 Shortness of Breath  6/11/19 19:10


 6/16/19 19:09  6/12/19 11:03


 


 


 Aspirin


  (ASA)  325 mg  DAILY


 ORAL


   6/12/19 15:00


 7/12/19 14:59   


 


 


 Clonidine HCl


  (Catapres tab)  0.2 mg  EVERY 8  HOURS


 ORAL


   6/12/19 14:00


 7/11/19 21:59  6/12/19 13:44


 


 


 Digoxin


  (Lanoxin)  0.25 mg  DAILY


 ORAL


   6/12/19 14:45


 7/12/19 14:44   


 


 


 Docusate Sodium


  (Colace)  100 mg  THREE TIMES A  DAY


 ORAL


   6/12/19 09:00


 7/12/19 08:59  6/12/19 13:41


 


 


 Famotidine


  (Pepcid)  20 mg  Q12HR


 ORAL


   6/11/19 21:00


 7/11/19 20:59  6/12/19 09:53


 


 


 Furosemide


  (Lasix)  20 mg  ONCE


 IV


   6/12/19 14:51


 6/12/19 16:00   


 


 


 Gabapentin


  (Neurontin)  100 mg  THREE TIMES A  DAY


 ORAL


   6/12/19 14:45


 7/12/19 14:44   


 


 


 Hydralazine HCl


  (Apresoline)  10 mg  Q6HR


 ORAL


   6/12/19 18:00


 7/12/19 17:59   


 


 


 Hydralazine HCl


  (Apresoline)  25 mg  Q4H  PRN


 ORAL


 bp over 160syst  6/11/19 19:03


 7/11/19 19:02   


 


 


 Iron Sucrose 200


 mg/Sodium Chloride  120 ml @ 


 240 mls/hr  ONCE


 IV


   6/12/19 16:00


 6/12/19 18:00   


 


 


 Isosorbide


 Mononitrate


  (Imdur)  30 mg  DAILY


 ORAL


   6/12/19 14:49


 7/12/19 14:48   


 


 


 Nitroglycerin


  (Ntg)  1 patch  Q24H


 TDERMAL


   6/11/19 20:00


 7/11/19 19:59  6/11/19 20:58


 


 


 Potassium Chloride


  (K-Dur)  40 meq  Q12HR


 ORAL


   6/11/19 21:00


 7/11/19 20:59  6/12/19 09:53


 


 


 Risperidone


  (RisperDAL)  2 mg  QHS


 ORAL


   6/11/19 21:00


 7/11/19 20:59  6/11/19 22:16


 


 


 Tamsulosin HCl


  (Flomax)  0.4 mg  BEDTIME


 ORAL


   6/11/19 21:00


 7/11/19 20:59  6/11/19 22:16


 


 


 Tramadol HCl


  (Ultram)  25 mg  Q6H  PRN


 ORAL


 pain 6 and above  6/11/19 19:07


 6/18/19 19:06   


 











Assessment/Plan


Problem List:  


(1) Cellulitis


Assessment & Plan:  58M with concerns for cellulitis of bilateral lower 

extremities.  mild edema.  no abscess. was tender on admission but now 

improved.  +psych history.





no acute surgical intervention necessary


will monitor for progression or improvement


clinical exams


Abx as per ID


skin protectant to both legs


keep elevated


likely CHF. 


thank you


ICD Codes:  L03.90 - Cellulitis, unspecified


SNOMED:  908037672


(2) CHF (congestive heart failure)


ICD Codes:  I50.9 - Heart failure, unspecified


SNOMED:  70854192


(3) Pleural effusion


ICD Codes:  J90 - Pleural effusion, not elsewhere classified


SNOMED:  69166873


(4) Pedal edema


ICD Codes:  R60.0 - Localized edema


SNOMED:  102612190











José Miguel Devine Jun 12, 2019 15:04

## 2019-06-12 NOTE — CARDIOLOGY REPORT
--------------- APPROVED REPORT --------------





EKG Measurement

Heart [id number]

OR 122P

[id number]

[id number]

[id number]





Sinus tachycardia

Right axis deviation

Low voltage QRS

Nonspecific T wave abnormality

Abnormal ECG

## 2019-06-12 NOTE — CONSULTATION
History of Present Illness


General


Date patient seen:  Jun 11, 2019


Chief Complaint:  AMS


Referring physician:  Dr. Ly





Present Illness


HPI


 Shiv Grigsby is a 58 year old  male with a PMH of drug abuse, 

smoking, homelessness, hypertension, BPH, GERD, psychosis,


pedal edema, and pleural effusion was [place] to [facility] [place] for AMS and HTN 

following an episode of screaming in the street about his legs hurting. 





Upon arrival to [facility] [place] he was also found to have hypokalemia, and borderline 

troponins. 





Neurology


Allergies:  


Coded Allergies:  


     No Known Allergies (Unverified , 2/2/18)





Medication History


Scheduled


No Known Medications* (NKM - No Known Medications*), 0 ., (Reported)





Scheduled PRN


Ibuprofen* (Motrin*), 600 MG ORAL Q6H PRN for For Pain





Patient History


Limited by:  medical condition


History Provided By:  Medical Record


Healthcare decision maker





Resuscitation status





Advanced Directive on File








Past Medical/Surgical History


Past Medical/Surgical History:  


(1) Psychosis


(2) CHF (congestive heart failure)


(3) Pleural effusion


(4) Pedal edema





Social History


Social History:  


(1) Homelessness





Review of Systems


Constitutional:  Denies: no symptoms, see HPI, chills, sweats, fever, malaise, 

weakness, other


Eye:  Denies: no symptoms, see HPI, eye pain, blurred vision, tearing, double 

vision, nose pain, nose congestion, acuity changes, discharge, other


ENT:  Denies: no symptoms, see HPI, ear pain, ear discharge, nose pain, nose 

congestion, throat pain, throat swelling, mouth pain, hearing loss, nasal 

discharge, other


Respiratory:  Denies: no symptoms, see HPI, cough, orthopnea, shortness of 

breath, stridor, wheezing, CUENCA, sputum, other


Cardiovascular:  Denies: no symptoms, see HPI, chest pain, edema, palpitations, 

syncope, PND, other


Gastrointestinal:  Denies: no symptoms, see HPI, abdominal pain, constipation, 

diarrhea, nausea, vomiting, melena, hematemesis, other


Genitourinary:  Denies: no symptoms, see HPI, discharge, dysuria, frequency, 

hematuria, pain, retention, incontinence, urgency, vag bleed/dc, other


Musculoskeletal:  Reports: muscle pain; Denies: no symptoms, see HPI, back pain

, gout, joint pain, joint swelling, muscle stiffness, other


Skin:  Denies: no symptoms, see HPI, rash, change in color, change in hair/nails

, dryness, lesions, other


Psychiatric:  Denies: no symptoms, see HPI, prior hx, anxiety, depressed 

feelings, emotional problems, SI, HI, hallucinations, other


Neurological:  Denies: no symptoms, see HPI, headache, numbness, paresthesia, 

seizure, tingling, tremors, focal weakness, syncope, dizziness, other


Endocrine:  Denies: no symptoms, see HPI, excessive sweating, flushing, 

intolerance to temperature, increased thirst, increased urine, unexplained 

weight loss, other


Hematologic/Lymphatic:  Denies: no symptoms, see HPI, anemia, blood clots, easy 

bleeding, easy bruising, swollen glands, diathesis, other





Physical Exam


General Appearance:  WD/WN, lethargic, confused, mild distress, thin


Lines, tubes and drains:  peripheral


HEENT:  normocephalic, atraumatic, anicteric, mucous membranes moist, PERRL, 

EOMI, pharynx normal, supple, no JVD


Neck:  non-tender, normal alignment, supple, normal inspection


Respiratory/Chest:  normal breath sounds, no respiratory distress, no accessory 

muscle use


Cardiovascular/Chest:  no JVD


Extremities:  normal range of motion, normal capillary refill


Skin Exam:  normal pigmentation, warm/dry, no diaphoresis


Neurologic:  CNs II-XII grossly normal, no motor/sensory deficits, responsive, 

disoriented, other


Musculoskeletal:  normal muscle bulk, no effusion





Last 24 Hour Vital Signs








  Date Time  Temp Pulse Resp B/P (MAP) Pulse Ox O2 Delivery O2 Flow Rate FiO2


 


6/11/19 22:16    165/95    


 


6/11/19 20:58    176/101    


 


6/11/19 20:00 97.8 117 22 176/101 (126) 94   


 


6/11/19 20:00  118      


 


6/11/19 20:00    170/101    


 


6/11/19 19:49  103 18  98 Room Air  21


 


6/11/19 18:50    171/119    


 


6/11/19 17:23      Room Air  


 


6/11/19 17:00 97.1 116 20 171/119 (136) 99   


 


6/11/19 16:46 98.1 105 18 162/100 98 Room Air  


 


6/11/19 16:29  105 18 162/100 98 Room Air  


 


6/11/19 15:23  110 20 166/100 92 Room Air  


 


6/11/19 14:38  115 18  99 Room Air  


 


6/11/19 14:23  112 22 176/94 89 Room Air  


 


6/11/19 12:41 98.1 114 18 146/82 100 Room Air  


 


6/11/19 12:30 97.2 120 20 158/75 (102) 98 Room Air  











Laboratory Tests








Test


  6/11/19


12:54 6/11/19


13:00 6/11/19


13:05 6/11/19


14:00


 


White Blood Count


  8.7 K/UL


(4.8-10.8) 


  


  


 


 


Red Blood Count


  4.04 M/UL


(4.70-6.10)  L 


  


  


 


 


Hemoglobin


  12.2 G/DL


(14.2-18.0)  L 


  


  


 


 


Hematocrit


  37.1 %


(42.0-52.0)  L 


  


  


 


 


Mean Corpuscular Volume 92 FL (80-99)     


 


Mean Corpuscular Hemoglobin


  30.3 PG


(27.0-31.0) 


  


  


 


 


Mean Corpuscular Hemoglobin


Concent 33.0 G/DL


(32.0-36.0) 


  


  


 


 


Red Cell Distribution Width


  13.6 %


(11.6-14.8) 


  


  


 


 


Platelet Count


  241 K/UL


(150-450) 


  


  


 


 


Mean Platelet Volume


  6.2 FL


(6.5-10.1)  L 


  


  


 


 


Neutrophils (%) (Auto)


  77.4 %


(45.0-75.0)  H 


  


  


 


 


Lymphocytes (%) (Auto)


  13.4 %


(20.0-45.0)  L 


  


  


 


 


Monocytes (%) (Auto)


  7.5 %


(1.0-10.0) 


  


  


 


 


Eosinophils (%) (Auto)


  0.9 %


(0.0-3.0) 


  


  


 


 


Basophils (%) (Auto)


  0.8 %


(0.0-2.0) 


  


  


 


 


Iron Level


  43 ug/dL


()  L 


  


  


 


 


Total Iron Binding Capacity


  297 ug/dL


(250-450) 


  


  


 


 


Percent Iron Saturation 14 % (15-50)  L   


 


Unsaturated Iron Binding


  254 ug/dL


(112-346) 


  


  


 


 


Ferritin


  65 NG/ML


(8-388) 


  


  


 


 


Troponin I


  0.060 ng/mL


(0.000-0.056) 


  


  0.065 ng/mL


(0.000-0.056)


 


Pro-B-Type Natriuretic Peptide


  4849 pg/mL


(0-125)  H 


  


  


 


 


Serum Alcohol < 3 mg/dL     


 


Urine Color  Yellow    


 


Urine Appearance  Clear    


 


Urine pH  5 (4.5-8.0)    


 


Urine Specific Gravity


  


  1.025


(1.005-1.035) 


  


 


 


Urine Protein


  


  3+ (NEGATIVE)


H 


  


 


 


Urine Glucose (UA)


  


  Negative


(NEGATIVE) 


  


 


 


Urine Ketones


  


  Negative


(NEGATIVE) 


  


 


 


Urine Blood


  


  2+ (NEGATIVE)


H 


  


 


 


Urine Nitrite


  


  Negative


(NEGATIVE) 


  


 


 


Urine Bilirubin


  


  Negative


(NEGATIVE) 


  


 


 


Urine Urobilinogen


  


  Normal MG/DL


(0.0-1.0) 


  


 


 


Urine Leukocyte Esterase


  


  Negative


(NEGATIVE) 


  


 


 


Urine RBC


  


  5-10 /HPF (0 -


0)  H 


  


 


 


Urine WBC


  


  0-2 /HPF (0 -


0) 


  


 


 


Urine Squamous Epithelial


Cells 


  Occasional


/LPF 


  


 


 


Urine Bacteria


  


  Occasional


/HPF (NONE) 


  


 


 


Urine Opiates Screen


  


  Negative


(NEGATIVE) 


  


 


 


Urine Barbiturates Screen


  


  Negative


(NEGATIVE) 


  


 


 


Phencyclidine (PCP) Screen


  


  Negative


(NEGATIVE) 


  


 


 


Urine Amphetamines Screen


  


  Negative


(NEGATIVE) 


  


 


 


Urine Benzodiazepines Screen


  


  Negative


(NEGATIVE) 


  


 


 


Urine Cocaine Screen


  


  Negative


(NEGATIVE) 


  


 


 


Urine Marijuana (THC) Screen


  


  Positive


(NEGATIVE)  H 


  


 


 


Prothrombin Time


  


  


  10.9 SEC


(9.30-11.50) 


 


 


Prothromb Time International


Ratio 


  


  1.0 (0.9-1.1)  


  


 


 


Activated Partial


Thromboplast Time 


  


  25 SEC (23-33)


  


 


 


D-Dimer


  


  


  0.93 mg/L FEU


(0.00-0.49)  H 


 


 


Total Creatine Kinase


  


  


  


  363 U/L


()  H


 


Test


  6/11/19


14:35 


  


  


 


 


Sodium Level


  138 MMOL/L


(136-145) 


  


  


 


 


Potassium Level


  3.2 MMOL/L


(3.5-5.1)  L 


  


  


 


 


Chloride Level


  105 MMOL/L


() 


  


  


 


 


Carbon Dioxide Level


  25 MMOL/L


(21-32) 


  


  


 


 


Anion Gap


  8 mmol/L


(5-15) 


  


  


 


 


Blood Urea Nitrogen


  31 mg/dL


(7-18)  H 


  


  


 


 


Creatinine


  1.4 MG/DL


(0.55-1.30)  H 


  


  


 


 


Estimat Glomerular Filtration


Rate > 60 mL/min


(>60) 


  


  


 


 


Glucose Level


  130 MG/DL


()  H 


  


  


 


 


Calcium Level


  8.4 MG/DL


(8.5-10.1)  L 


  


  


 


 


Total Bilirubin


  0.5 MG/DL


(0.2-1.0) 


  


  


 


 


Aspartate Amino Transf


(AST/SGOT) 49 U/L (15-37)


H 


  


  


 


 


Alanine Aminotransferase


(ALT/SGPT) 90 U/L (12-78)


H 


  


  


 


 


Alkaline Phosphatase


  82 U/L


() 


  


  


 


 


Total Protein


  6.0 G/DL


(6.4-8.2)  L 


  


  


 


 


Albumin


  3.1 G/DL


(3.4-5.0)  L 


  


  


 


 


Globulin 2.9 g/dL     


 


Albumin/Globulin Ratio 1.1 (1.0-2.7)     








Height (Feet):  5


Height (Inches):  9.00


Weight (Pounds):  180


Medications





Current Medications








 Medications


  (Trade)  Dose


 Ordered  Sig/Nighat


 Route


 PRN Reason  Start Time


 Stop Time Status Last Admin


Dose Admin


 


 Acetaminophen


  (Tylenol)  650 mg  Q4H  PRN


 ORAL


 Mild Pain/Temp > 100.5  6/11/19 19:07


 7/11/19 19:06   


 


 


 Albuterol/


 Ipratropium


  (Albuterol/


 Ipratropium)  3 ml  Q4H  PRN


 HHN


 Shortness of Breath  6/11/19 19:10


 6/16/19 19:09  6/11/19 19:49


 


 


 Clonidine HCl


  (Catapres Tab)  0.1 mg  EVERY 8  HOURS


 ORAL


   6/11/19 22:00


 7/11/19 21:59  6/11/19 22:16


 


 


 Clonidine HCl


  (Catapres Tab)  0.1 mg  Q6H  PRN


 ORAL


 For SBP >170  6/11/19 18:34


 7/11/19 18:33  6/11/19 18:50


 


 


 Docusate Sodium


  (Colace)  100 mg  THREE TIMES A  DAY


 ORAL


   6/12/19 09:00


 7/12/19 08:59   


 


 


 Famotidine


  (Pepcid)  20 mg  Q12HR


 ORAL


   6/11/19 21:00


 7/11/19 20:59  6/11/19 22:16


 


 


 Hydralazine HCl


  (Apresoline)  25 mg  Q4H  PRN


 ORAL


 bp over 160syst  6/11/19 19:03


 7/11/19 19:02   


 


 


 Nitroglycerin


  (Ntg)  1 patch  Q24H


 TDERMAL


   6/11/19 20:00


 7/11/19 19:59  6/11/19 20:58


 


 


 Potassium Chloride


  (K-Dur)  40 meq  Q12HR


 ORAL


   6/11/19 21:00


 7/11/19 20:59  6/11/19 22:16


 


 


 Risperidone


  (RisperDAL)  2 mg  QHS


 ORAL


   6/11/19 21:00


 7/11/19 20:59  6/11/19 22:16


 


 


 Tamsulosin HCl


  (Flomax)  0.4 mg  BEDTIME


 ORAL


   6/11/19 21:00


 7/11/19 20:59  6/11/19 22:16


 


 


 Tramadol HCl


  (Ultram)  25 mg  Q6H  PRN


 ORAL


 pain 6 and above  6/11/19 19:07


 6/18/19 19:06   


 











Assessment/Plan


Problem List:  


(1) Acute encephalopathy


ICD Codes:  G93.40 - Encephalopathy, unspecified


SNOMED:  87942103, 450456281


(2) Cellulitis


ICD Codes:  L03.90 - Cellulitis, unspecified


SNOMED:  155870326


(3) CHF (congestive heart failure)


ICD Codes:  I50.9 - Heart failure, unspecified


SNOMED:  35314458


(4) Pleural effusion


ICD Codes:  J90 - Pleural effusion, not elsewhere classified


SNOMED:  35906528


(5) Pedal edema


ICD Codes:  R60.0 - Localized edema


SNOMED:  155034880


(6) Hypomagnesemia


ICD Codes:  E83.42 - Hypomagnesemia


SNOMED:  764406209


(7) Hypocalcemia


ICD Codes:  E83.51 - Hypocalcemia


SNOMED:  3076710


(8) Psychosis


ICD Codes:  F29 - Unspecified psychosis not due to a substance or known 

physiological condition


SNOMED:  85422167


Qualifiers:  


   Qualified Codes:  F29 - Unspecified psychosis not due to a substance or 

known physiological condition


Assessment/Plan:


Patient is not entirely cooperative with exam  and is confused/ significantly 

dysarthric secondary to poor dentation, but is not exhitibing any grossly 

apparent focal deficits 





Given HTN and AMS will obtain CT scan





Acute encephalopathy secondary to metabolic causes likely 





Abx as per PMD





Venous doppler of LEs 





Replace/ Replete lytes 





Na 135-145





Maintain normoglycemia with ISS





maintain normothermia 





Continue Q4 hour Neuro obs 


SBP<140





Use IV hydralazine if oral isn't effective . 





PT Nahomy as able. 





Swallow Haylee Lancaster N.P. Jun 12, 2019 00:22

## 2019-06-12 NOTE — NUR
HOMELESS COORDINATOR



 spoke with patient and patient is alert and slightly disoriented. Patient is slurring 
words. Patient is giving out his SS# in place of his contact number. Patient does not have a 
contact number. Patient states he has been homeless for 10 years. Patient unable to provided 
previous living location but states he usually just sleeps on the streets. Patient stated he 
doesn't want shelter placement. Patient unable to provide an emergency contact.   Patient is 
aware he is in the hospital and stated he had someone passing by call 911 because his legs 
were hurting. Patient appears to be a poor historian and provides inconsistent information. 
Patient unable to recall his social security number.  Patient stated he has a mental health 
disorder but doesn't recall what is was.  Patient states he has prior drug history of 
smoking crack and cocaine. Patient refuses resources for substance abuse and mental health. 



Patient continues to require medical intervention. Will continue to monitor and assist as 
needed.

-------------------------------------------------------------------------------

Addendum: 06/12/19 at 1639 by CECIL ESTRADA 

-------------------------------------------------------------------------------

Patient can Walk- In [facility] [place] @ 8am Mon-Fri -[address] [place] [address]. [phone].

## 2019-06-12 NOTE — CARDIOLOGY PROGRESS NOTE
Assessment/Plan


Assessment/Plan


The patient is seen and examined, full consult note will be dictated shortly.





Objective





Last 24 Hour Vital Signs








  Date Time  Temp Pulse Resp B/P (MAP) Pulse Ox O2 Delivery O2 Flow Rate FiO2


 


6/12/19 17:11    131/80    


 


6/12/19 17:11  102      


 


6/12/19 16:00 98.2 102 21 131/80 (97) 98   


 


6/12/19 13:44    132/79    


 


6/12/19 12:00 98.6 111 20 147/123 (131) 99   


 


6/12/19 11:14  116 20  100 Room Air  21


 


6/12/19 11:00  117 22  100 Room Air  21


 


6/12/19 09:00      Room Air  


 


6/12/19 08:00 98.0 110 21 156/115 (129) 99   


 


6/12/19 06:25    147/97    


 


6/12/19 04:00 98.3 112 22 147/97 (114) 99   


 


6/12/19 00:00 97.1 117 20 152/100 (117) 94   


 


6/11/19 22:16    165/95    


 


6/11/19 21:00      Room Air  


 


6/11/19 20:58    176/101    


 


6/11/19 20:00 97.8 117 22 176/101 (126) 94   


 


6/11/19 20:00  118      


 


6/11/19 20:00    170/101    

















Intake and Output  


 


 6/11/19 6/12/19





 19:00 07:00


 


  


 


# Voids 2 











Laboratory Tests








Test


  6/12/19


05:25


 


White Blood Count


  7.6 K/UL


(4.8-10.8)


 


Red Blood Count


  3.68 M/UL


(4.70-6.10)  L


 


Hemoglobin


  10.8 G/DL


(14.2-18.0)  L


 


Hematocrit


  33.4 %


(42.0-52.0)  L


 


Mean Corpuscular Volume 91 FL (80-99)  


 


Mean Corpuscular Hemoglobin


  29.5 PG


(27.0-31.0)


 


Mean Corpuscular Hemoglobin


Concent 32.5 G/DL


(32.0-36.0)


 


Red Cell Distribution Width


  13.6 %


(11.6-14.8)


 


Platelet Count


  199 K/UL


(150-450)


 


Mean Platelet Volume


  6.0 FL


(6.5-10.1)  L


 


Neutrophils (%) (Auto)


  75.5 %


(45.0-75.0)  H


 


Lymphocytes (%) (Auto)


  13.9 %


(20.0-45.0)  L


 


Monocytes (%) (Auto)


  8.2 %


(1.0-10.0)


 


Eosinophils (%) (Auto)


  1.2 %


(0.0-3.0)


 


Basophils (%) (Auto)


  1.2 %


(0.0-2.0)


 


Sodium Level


  142 MMOL/L


(136-145)


 


Potassium Level


  3.6 MMOL/L


(3.5-5.1)


 


Chloride Level


  110 MMOL/L


()  H


 


Carbon Dioxide Level


  24 MMOL/L


(21-32)


 


Anion Gap


  9 mmol/L


(5-15)


 


Blood Urea Nitrogen


  29 mg/dL


(7-18)  H


 


Creatinine


  1.4 MG/DL


(0.55-1.30)  H


 


Estimat Glomerular Filtration


Rate > 60 mL/min


(>60)


 


Glucose Level


  125 MG/DL


()  H


 


Hemoglobin A1c


  7.0 %


(4.3-6.0)  H


 


Uric Acid


  6.9 MG/DL


(2.6-7.2)


 


Calcium Level


  8.2 MG/DL


(8.5-10.1)  L


 


Phosphorus Level


  3.2 MG/DL


(2.5-4.9)


 


Magnesium Level


  1.6 MG/DL


(1.8-2.4)  L


 


Total Bilirubin


  0.3 MG/DL


(0.2-1.0)


 


Gamma Glutamyl Transpeptidase 80 U/L (5-85)  


 


Aspartate Amino Transf


(AST/SGOT) 42 U/L (15-37)


H


 


Alanine Aminotransferase


(ALT/SGPT) 75 U/L (12-78)


 


 


Alkaline Phosphatase


  65 U/L


()


 


Ammonia


  44 umol/L


(11-32)  H


 


Total Creatine Kinase


  387 U/L


()  H


 


Troponin I


  0.090 ng/mL


(0.000-0.056)


 


C-Reactive Protein,


Quantitative < 0.4 mg/dL


(0.00-0.90)


 


Pro-B-Type Natriuretic Peptide


  4113 pg/mL


(0-125)  H


 


Total Protein


  5.2 G/DL


(6.4-8.2)  L


 


Albumin


  2.6 G/DL


(3.4-5.0)  L


 


Globulin 2.6 g/dL  


 


Albumin/Globulin Ratio 1.0 (1.0-2.7)  


 


Triglycerides Level


  42 MG/DL


()


 


Cholesterol Level


  112 MG/DL (<


200)


 


LDL Cholesterol


  73 mg/dL


(<100)


 


HDL Cholesterol


  30 MG/DL


(40-60)  L


 


Cholesterol/HDL Ratio 3.7 (3.3-4.4)  


 


Vitamin B12 Level


  421 PG/ML


(193-986)


 


Folate


  16.7 NG/ML


(8.6-58.9)


 


Thyroid Stimulating Hormone


(TSH) 2.555 uiU/mL


(0.358-3.740)

















Lavon Amador MD Jun 12, 2019 19:54

## 2019-06-13 VITALS — DIASTOLIC BLOOD PRESSURE: 83 MMHG | SYSTOLIC BLOOD PRESSURE: 158 MMHG

## 2019-06-13 VITALS — DIASTOLIC BLOOD PRESSURE: 99 MMHG | SYSTOLIC BLOOD PRESSURE: 149 MMHG

## 2019-06-13 VITALS — SYSTOLIC BLOOD PRESSURE: 162 MMHG | DIASTOLIC BLOOD PRESSURE: 108 MMHG

## 2019-06-13 VITALS — SYSTOLIC BLOOD PRESSURE: 139 MMHG | DIASTOLIC BLOOD PRESSURE: 86 MMHG

## 2019-06-13 VITALS — SYSTOLIC BLOOD PRESSURE: 125 MMHG | DIASTOLIC BLOOD PRESSURE: 81 MMHG

## 2019-06-13 VITALS — SYSTOLIC BLOOD PRESSURE: 133 MMHG | DIASTOLIC BLOOD PRESSURE: 77 MMHG

## 2019-06-13 VITALS — SYSTOLIC BLOOD PRESSURE: 167 MMHG | DIASTOLIC BLOOD PRESSURE: 116 MMHG

## 2019-06-13 VITALS — SYSTOLIC BLOOD PRESSURE: 146 MMHG | DIASTOLIC BLOOD PRESSURE: 101 MMHG

## 2019-06-13 LAB
ADD MANUAL DIFF: NO
ALBUMIN SERPL-MCNC: 2.9 G/DL (ref 3.4–5)
ALBUMIN/GLOB SERPL: 1 {RATIO} (ref 1–2.7)
ALP SERPL-CCNC: 67 U/L (ref 46–116)
ALT SERPL-CCNC: 76 U/L (ref 12–78)
ANION GAP SERPL CALC-SCNC: 9 MMOL/L (ref 5–15)
AST SERPL-CCNC: 52 U/L (ref 15–37)
BASOPHILS NFR BLD AUTO: 1.2 % (ref 0–2)
BILIRUB SERPL-MCNC: 0.2 MG/DL (ref 0.2–1)
BUN SERPL-MCNC: 25 MG/DL (ref 7–18)
CALCIUM SERPL-MCNC: 8.3 MG/DL (ref 8.5–10.1)
CHLORIDE SERPL-SCNC: 106 MMOL/L (ref 98–107)
CK SERPL-CCNC: 475 U/L (ref 26–308)
CO2 SERPL-SCNC: 24 MMOL/L (ref 21–32)
CREAT SERPL-MCNC: 1.4 MG/DL (ref 0.55–1.3)
EOSINOPHIL NFR BLD AUTO: 1.5 % (ref 0–3)
ERYTHROCYTE [DISTWIDTH] IN BLOOD BY AUTOMATED COUNT: 13.4 % (ref 11.6–14.8)
GLOBULIN SER-MCNC: 3 G/DL
HCT VFR BLD CALC: 34.4 % (ref 42–52)
HGB BLD-MCNC: 11.3 G/DL (ref 14.2–18)
LYMPHOCYTES NFR BLD AUTO: 11.6 % (ref 20–45)
MCV RBC AUTO: 91 FL (ref 80–99)
MONOCYTES NFR BLD AUTO: 6.3 % (ref 1–10)
NEUTROPHILS NFR BLD AUTO: 79.4 % (ref 45–75)
PHOSPHATE SERPL-MCNC: 2.8 MG/DL (ref 2.5–4.9)
PLATELET # BLD: 206 K/UL (ref 150–450)
POTASSIUM SERPL-SCNC: 4.1 MMOL/L (ref 3.5–5.1)
RBC # BLD AUTO: 3.79 M/UL (ref 4.7–6.1)
SODIUM SERPL-SCNC: 139 MMOL/L (ref 136–145)
WBC # BLD AUTO: 7.7 K/UL (ref 4.8–10.8)

## 2019-06-13 RX ADMIN — ISOSORBIDE MONONITRATE SCH MG: 30 TABLET, EXTENDED RELEASE ORAL at 09:15

## 2019-06-13 RX ADMIN — DOCUSATE SODIUM SCH MG: 100 CAPSULE, LIQUID FILLED ORAL at 18:34

## 2019-06-13 RX ADMIN — ENALAPRIL MALEATE SCH MG: 2.5 TABLET ORAL at 18:35

## 2019-06-13 RX ADMIN — NITROGLYCERIN SCH PATCH: 0.4 PATCH TRANSDERMAL at 20:28

## 2019-06-13 RX ADMIN — TAMSULOSIN HYDROCHLORIDE SCH MG: 0.4 CAPSULE ORAL at 20:30

## 2019-06-13 RX ADMIN — HYDRALAZINE HYDROCHLORIDE SCH MG: 10 TABLET ORAL at 13:22

## 2019-06-13 RX ADMIN — DOCUSATE SODIUM SCH MG: 100 CAPSULE, LIQUID FILLED ORAL at 09:16

## 2019-06-13 RX ADMIN — DIGOXIN SCH MG: 0.12 TABLET ORAL at 09:16

## 2019-06-13 RX ADMIN — BENZTROPINE MESYLATE SCH MG: 1 TABLET ORAL at 20:30

## 2019-06-13 RX ADMIN — HYDRALAZINE HYDROCHLORIDE SCH MG: 10 TABLET ORAL at 23:00

## 2019-06-13 RX ADMIN — HYDRALAZINE HYDROCHLORIDE SCH MG: 10 TABLET ORAL at 05:01

## 2019-06-13 RX ADMIN — DOCUSATE SODIUM SCH MG: 100 CAPSULE, LIQUID FILLED ORAL at 13:21

## 2019-06-13 NOTE — GENERAL PROGRESS NOTE
Assessment/Plan


Problem List:  


(1) Pedal edema


ICD Codes:  R60.0 - Localized edema


SNOMED:  994441702


(2) Hypomagnesemia


ICD Codes:  E83.42 - Hypomagnesemia


SNOMED:  084910061


(3) Psychosis


ICD Codes:  F29 - Unspecified psychosis not due to a substance or known 

physiological condition


SNOMED:  12631228


Qualifiers:  


   Qualified Codes:  F29 - Unspecified psychosis not due to a substance or 

known physiological condition


(4) Hypertension


ICD Codes:  I10 - Essential (primary) hypertension


SNOMED:  60482228


(5) Diabetic nephropathy


ICD Codes:  E11.21 - Type 2 diabetes mellitus with diabetic nephropathy


SNOMED:  867708484


(6) Cellulitis


ICD Codes:  L03.90 - Cellulitis, unspecified


SNOMED:  410443410


Status:  progressing


Assessment/Plan:


afebrile


lyte abnormality


edema


needs placement


niddm


htn





Subjective


ROS Limited/Unobtainable:  Yes


Allergies:  


Coded Allergies:  


     No Known Allergies (Unverified , 2/2/18)





Objective





Last 24 Hour Vital Signs








  Date Time  Temp Pulse Resp B/P (MAP) Pulse Ox O2 Delivery O2 Flow Rate FiO2


 


6/13/19 20:30  113  167/116    


 


6/13/19 20:28    167/116    


 


6/13/19 20:00 98.3 113 24 167/116 (133) 96   


 


6/13/19 19:30  102 20  97 Room Air  21


 


6/13/19 18:35    158/83    


 


6/13/19 16:00 98.4 105 20 158/83 (108) 99   


 


6/13/19 13:22    125/81    


 


6/13/19 12:00  98      


 


6/13/19 12:00 98.1 108 20 125/81 (96) 93   


 


6/13/19 09:16    146/101    


 


6/13/19 09:16  103      


 


6/13/19 09:15  103  146/101    


 


6/13/19 09:15    146/101    


 


6/13/19 09:00  111      


 


6/13/19 09:00      Room Air  


 


6/13/19 08:05  103 19  96 Room Air  21


 


6/13/19 08:00 97.9 114 19 146/101 (116) 96   


 


6/13/19 05:01    133/77    


 


6/13/19 04:00 97.7 102 19 133/77 (95) 96   


 


6/13/19 00:00 96.6 111 20 139/86 (103) 96   


 


6/12/19 23:34    126/60    

















Intake and Output  


 


 6/12/19 6/13/19





 18:59 06:59


 


Intake Total 800 ml 240 ml


 


Balance 800 ml 240 ml


 


  


 


Intake Oral 800 ml 240 ml


 


# Voids 3 2








Laboratory Tests


6/13/19 05:35: 


White Blood Count 7.7, Red Blood Count 3.79L, Hemoglobin 11.3L, Hematocrit 34.4L

, Mean Corpuscular Volume 91, Mean Corpuscular Hemoglobin 29.8, Mean 

Corpuscular Hemoglobin Concent 32.8, Red Cell Distribution Width 13.4, Platelet 

Count 206, Mean Platelet Volume 6.9, Neutrophils (%) (Auto) 79.4H, Lymphocytes (

%) (Auto) 11.6L, Monocytes (%) (Auto) 6.3, Eosinophils (%) (Auto) 1.5, 

Basophils (%) (Auto) 1.2, Sodium Level 139, Potassium Level 4.1, Chloride Level 

106, Carbon Dioxide Level 24, Anion Gap 9, Blood Urea Nitrogen 25H, Creatinine 

1.4H, Estimat Glomerular Filtration Rate > 60, Glucose Level 152H, Uric Acid 6.8

, Calcium Level 8.3L, Phosphorus Level 2.8, Magnesium Level 1.8, Total 

Bilirubin 0.2, Aspartate Amino Transf (AST/SGOT) 52H, Alanine Aminotransferase (

ALT/SGPT) 76, Alkaline Phosphatase 67, Total Creatine Kinase 475H, C-Reactive 

Protein, Quantitative < 0.4, Pro-B-Type Natriuretic Peptide 2205H, Total 

Protein 5.9L, Albumin 2.9L, Globulin 3.0, Albumin/Globulin Ratio 1.0, Digoxin 

Level < 0.2L


Height (Feet):  5


Height (Inches):  9.00


Weight (Pounds):  182


Neck:  supple


Cardiovascular:  normal rate


Respiratory/Chest:  lungs clear


Abdomen:  non tender











Ji Kirk MD Jun 13, 2019 22:08

## 2019-06-13 NOTE — NUR
****DISCHARGE PLANNING

PER DR NAJERA REQUEST

CLINCALS FAXED TO 

[place]

T: [phone]

F: [phone]

## 2019-06-13 NOTE — NUR
NURSE NOTES:

Received report from DEVAN Mondragon. Patient in bed resting, no active s/s cardiac, 
respiratory distress noticed at this time. Patient refused to wear cardiac monitor at this 
time. Patient AOx2, speaking himself, on room air, denies pain at this time. IV on right FA 
20G, asymptomatic, patent, intact. Endorsed CT head without contrast schedule for today 
6/13/19, MD made aware of result of D-dimer. Bed in lowest position, side rails upx3, call 
light within reach. Will continue to monitor.

## 2019-06-13 NOTE — NUR
ST NOTE: BEDSIDE SWALLOW EVAL 



RECEIVED BEDSIDE SWALLOW EVAL ORDER

CHART REVIEWED PRIOR THE EVALUATION 



REFERRED BY [address], PRIMARY PHYSICIAN, DR. NAJERA.



PT IS A 58-YEAR-OLD MALE WHO WAS ADMITTED DUE TO CELLULITIS OF LOWER EXTREMITIES. 

PER CXR: CHF AND BILATERAL PLEURAL EFFUSIONS.

PT HAS H/O OF DRUG ABUSE, PSYCH(SCHIZOPHRENIA, PSYCHOSIS).



CURRENTLY, PT IS ON COGENTIN AND RISPERDAL. 



PLOF: PT IS HOMELESS.



CURRENT STATUS: PT SEEN AT THE EDGE OF BED. ALERT, ORIENTED X 1 TO 2, 

PT HAS PILL ROLLING TREMOR, RUBBING HIS THUMB AND INDEX FINGER, NOTED WHEN PT IS SITTING 
UPRIGHT. VOICE IS CLEAR, BUT SEEMS HAS MILD DYSARTHRIA. 

PT WAS ABLE TO FOLLOW DIRECTIONS. 



GIVEN PO TRIALS: THIN(CUP), PUREE(TSP) AND CRACKERS X 2



INITIAL IMPRESSION: 

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 

PT IS MISSING ALL UPPER TEETH; AND BOTTOM MOLAR TEETH. 

MILDLY REDUCED 

SLOW BUT GOOD MASTICATION TIME, GOOD ORAL TRANSIT TIME, 

FAIR TO GOOD LARYNGEAL ELEVATION, NO OVERT S/S OF ASPIRATION. 

 



RECOMMENDATIONS: 

1. CONTINUE CARDIAC REGULAR WITH THIN LIQUIDS DIET. 

2. ASPIRATION PRECAUTIONS 

3. SPEECH/LANGUAGE/COGNITION EVAL 

4. CONSIDER MODIFIED BARIUM SWALLOW STUDY 



D/W DEVAN, RITA.

## 2019-06-13 NOTE — NEUROLOGY PROGRESS NOTE
Objective


Physical Exam





Last Vital Signs








  Date Time  Temp Pulse Resp B/P (MAP) Pulse Ox O2 Delivery O2 Flow Rate FiO2


 


6/13/19 00:00 96.6 111 20 139/86 (103) 96   


 


6/12/19 21:35      Room Air  21











Laboratory Tests








Test


  6/12/19


05:25


 


White Blood Count


  7.6 K/UL


(4.8-10.8)


 


Red Blood Count


  3.68 M/UL


(4.70-6.10)  L


 


Hemoglobin


  10.8 G/DL


(14.2-18.0)  L


 


Hematocrit


  33.4 %


(42.0-52.0)  L


 


Mean Corpuscular Volume 91 FL (80-99)  


 


Mean Corpuscular Hemoglobin


  29.5 PG


(27.0-31.0)


 


Mean Corpuscular Hemoglobin


Concent 32.5 G/DL


(32.0-36.0)


 


Red Cell Distribution Width


  13.6 %


(11.6-14.8)


 


Platelet Count


  199 K/UL


(150-450)


 


Mean Platelet Volume


  6.0 FL


(6.5-10.1)  L


 


Neutrophils (%) (Auto)


  75.5 %


(45.0-75.0)  H


 


Lymphocytes (%) (Auto)


  13.9 %


(20.0-45.0)  L


 


Monocytes (%) (Auto)


  8.2 %


(1.0-10.0)


 


Eosinophils (%) (Auto)


  1.2 %


(0.0-3.0)


 


Basophils (%) (Auto)


  1.2 %


(0.0-2.0)


 


Sodium Level


  142 MMOL/L


(136-145)


 


Potassium Level


  3.6 MMOL/L


(3.5-5.1)


 


Chloride Level


  110 MMOL/L


()  H


 


Carbon Dioxide Level


  24 MMOL/L


(21-32)


 


Anion Gap


  9 mmol/L


(5-15)


 


Blood Urea Nitrogen


  29 mg/dL


(7-18)  H


 


Creatinine


  1.4 MG/DL


(0.55-1.30)  H


 


Estimat Glomerular Filtration


Rate > 60 mL/min


(>60)


 


Glucose Level


  125 MG/DL


()  H


 


Hemoglobin A1c


  7.0 %


(4.3-6.0)  H


 


Uric Acid


  6.9 MG/DL


(2.6-7.2)


 


Calcium Level


  8.2 MG/DL


(8.5-10.1)  L


 


Phosphorus Level


  3.2 MG/DL


(2.5-4.9)


 


Magnesium Level


  1.6 MG/DL


(1.8-2.4)  L


 


Total Bilirubin


  0.3 MG/DL


(0.2-1.0)


 


Gamma Glutamyl Transpeptidase 80 U/L (5-85)  


 


Aspartate Amino Transf


(AST/SGOT) 42 U/L (15-37)


H


 


Alanine Aminotransferase


(ALT/SGPT) 75 U/L (12-78)


 


 


Alkaline Phosphatase


  65 U/L


()


 


Ammonia


  44 umol/L


(11-32)  H


 


Total Creatine Kinase


  387 U/L


()  H


 


Troponin I


  0.090 ng/mL


(0.000-0.056)


 


C-Reactive Protein,


Quantitative < 0.4 mg/dL


(0.00-0.90)


 


Pro-B-Type Natriuretic Peptide


  4113 pg/mL


(0-125)  H


 


Total Protein


  5.2 G/DL


(6.4-8.2)  L


 


Albumin


  2.6 G/DL


(3.4-5.0)  L


 


Globulin 2.6 g/dL  


 


Albumin/Globulin Ratio 1.0 (1.0-2.7)  


 


Triglycerides Level


  42 MG/DL


()


 


Cholesterol Level


  112 MG/DL (<


200)


 


LDL Cholesterol


  73 mg/dL


(<100)


 


HDL Cholesterol


  30 MG/DL


(40-60)  L


 


Cholesterol/HDL Ratio 3.7 (3.3-4.4)  


 


Vitamin B12 Level


  421 PG/ML


(193-986)


 


Folate


  16.7 NG/ML


(8.6-58.9)


 


Thyroid Stimulating Hormone


(TSH) 2.555 uiU/mL


(0.358-3.740)











Impression/Recommendations


Problems:  


(1) Acute encephalopathy


(2) Cellulitis


(3) CHF (congestive heart failure)


(4) Pleural effusion


(5) Pedal edema


(6) Hypomagnesemia


(7) Hypocalcemia


(8) Psychosis











Haylee Bhatti N.P. Jun 13, 2019 02:38

## 2019-06-13 NOTE — NUR
NURSE NOTES:

Patient refuse cardiac monitor stated want to have a break from cardiac monitor. Benefits 
and risk explained. Will continue to monitor.

## 2019-06-13 NOTE — SURGERY PROGRESS NOTE
Surgery Progress Note


Subjective


Additional Comments


Patient seen and examined at bedside.  No acute events.  States he feels 

better.  No nausea vomiting fever or chills.  Exam stable.





Objective





Last 24 Hour Vital Signs








  Date Time  Temp Pulse Resp B/P (MAP) Pulse Ox O2 Delivery O2 Flow Rate FiO2


 


6/13/19 09:16    146/101    


 


6/13/19 09:16  103      


 


6/13/19 09:15  103  146/101    


 


6/13/19 09:15    146/101    


 


6/13/19 09:00  111      


 


6/13/19 09:00      Room Air  


 


6/13/19 08:05  103 19  96 Room Air  21


 


6/13/19 08:00 97.9 114 19 146/101 (116) 96   


 


6/13/19 05:01    133/77    


 


6/13/19 04:00 97.7 102 19 133/77 (95) 96   


 


6/13/19 00:00 96.6 111 20 139/86 (103) 96   


 


6/12/19 23:34    126/60    


 


6/12/19 21:35  104 20  97 Room Air  21


 


6/12/19 21:27  107  120/71    


 


6/12/19 21:00      Room Air  


 


6/12/19 20:20    120/71    


 


6/12/19 20:00 96.6 107 19 120/71 (87) 96   


 


6/12/19 20:00  102      


 


6/12/19 17:11    131/80    


 


6/12/19 17:11  102      


 


6/12/19 16:00 98.2 102 21 131/80 (97) 98   


 


6/12/19 13:44    132/79    








I&O











Intake and Output  


 


 6/12/19 6/13/19





 19:00 07:00


 


Intake Total 800 ml 240 ml


 


Balance 800 ml 240 ml


 


  


 


Intake Oral 800 ml 240 ml


 


# Voids 3 2








Dressing:  dry


Wound:  clean


Drains:  none


Cardiovascular:  RSR


Respiratory:  clear


Abdomen:  soft, flat, non-tender, present bowel sounds, non-distended


Extremities:  edema, no tenderness, no cyanosis





Laboratory Tests








Test


  6/13/19


05:35


 


White Blood Count


  7.7 K/UL


(4.8-10.8)


 


Red Blood Count


  3.79 M/UL


(4.70-6.10)  L


 


Hemoglobin


  11.3 G/DL


(14.2-18.0)  L


 


Hematocrit


  34.4 %


(42.0-52.0)  L


 


Mean Corpuscular Volume 91 FL (80-99)  


 


Mean Corpuscular Hemoglobin


  29.8 PG


(27.0-31.0)


 


Mean Corpuscular Hemoglobin


Concent 32.8 G/DL


(32.0-36.0)


 


Red Cell Distribution Width


  13.4 %


(11.6-14.8)


 


Platelet Count


  206 K/UL


(150-450)


 


Mean Platelet Volume


  6.9 FL


(6.5-10.1)


 


Neutrophils (%) (Auto)


  79.4 %


(45.0-75.0)  H


 


Lymphocytes (%) (Auto)


  11.6 %


(20.0-45.0)  L


 


Monocytes (%) (Auto)


  6.3 %


(1.0-10.0)


 


Eosinophils (%) (Auto)


  1.5 %


(0.0-3.0)


 


Basophils (%) (Auto)


  1.2 %


(0.0-2.0)


 


Sodium Level


  139 MMOL/L


(136-145)


 


Potassium Level


  4.1 MMOL/L


(3.5-5.1)


 


Chloride Level


  106 MMOL/L


()


 


Carbon Dioxide Level


  24 MMOL/L


(21-32)


 


Anion Gap


  9 mmol/L


(5-15)


 


Blood Urea Nitrogen


  25 mg/dL


(7-18)  H


 


Creatinine


  1.4 MG/DL


(0.55-1.30)  H


 


Estimat Glomerular Filtration


Rate > 60 mL/min


(>60)


 


Glucose Level


  152 MG/DL


()  H


 


Uric Acid


  6.8 MG/DL


(2.6-7.2)


 


Calcium Level


  8.3 MG/DL


(8.5-10.1)  L


 


Phosphorus Level


  2.8 MG/DL


(2.5-4.9)


 


Magnesium Level


  1.8 MG/DL


(1.8-2.4)


 


Total Bilirubin


  0.2 MG/DL


(0.2-1.0)


 


Aspartate Amino Transf


(AST/SGOT) 52 U/L (15-37)


H


 


Alanine Aminotransferase


(ALT/SGPT) 76 U/L (12-78)


 


 


Alkaline Phosphatase


  67 U/L


()


 


Total Creatine Kinase


  475 U/L


()  H


 


C-Reactive Protein,


Quantitative < 0.4 mg/dL


(0.00-0.90)


 


Pro-B-Type Natriuretic Peptide


  2205 pg/mL


(0-125)  H


 


Total Protein


  5.9 G/DL


(6.4-8.2)  L


 


Albumin


  2.9 G/DL


(3.4-5.0)  L


 


Globulin 3.0 g/dL  


 


Albumin/Globulin Ratio 1.0 (1.0-2.7)  


 


Digoxin Level


  < 0.2 NG/ML


(0.5-2.0)  L











Plan


Problems:  


(1) Cellulitis


Assessment & Plan:  58M with concerns for cellulitis of bilateral lower 

extremities.  mild edema.  no abscess. was tender on admission but now 

improved.  +psych history.





no acute surgical intervention necessary


will monitor for progression or improvement


clinical exams


Abx as per ID


skin protectant to both legs


keep elevated


likely CHF. 


thank you 





(2) CHF (congestive heart failure)


(3) Pleural effusion


(4) Pedal edema











José Miguel Devine Jun 13, 2019 12:09

## 2019-06-13 NOTE — CONSULTATION
DATE OF CONSULTATION:  06/12/2019

NOTE:  POOR AUDIO



CONSULTING PHYSICIAN:  Jerri Jensen M.D.



REFERRING PHYSICIAN:  Ji Kirk M.D.



HISTORY OF PRESENT ILLNESS:  The patient is 58-year-old male with a history

of multiple medical problems including schizophrenia who has been admitted

to the hospital due to lower extremity cellulitis.  The patient is _____

disheveled and he has been _____ himself.  The patient has tardive

dyskinesia and is denying any suicidal or homicidal ideation.  He has

auditory hallucinations.  He is able to provide history.



PAST PSYCHIATRIC HISTORY:  Schizophrenia _____ psychiatric

hospitalizations.  Denied any suicide attempt.  The patient denied any

_____.



ALLERGIES:  No known drug allergies.



PAST MEDICAL HISTORY:  CHF, renal  failure, cardiomyopathy, anemia, and

diabetes.



SUBSTANCE ABUSE HISTORY:  He denied any history of illicit drug use or

alcohol.  Toxicology positive for marijuana.



MENTAL STATUS EXAMINATION:  The patient is alert and oriented x3.  Mood is

depressed and irritable.  Affect is constricted, congruent with mood.

Thought process is concrete.  Thought content, no suicidal or homicidal

ideations.



ASSESSMENT:

Axis I  Schizophrenia.

Axis II  Deferred.

Axis III  As above.

Axis IV  Low.

Axis  35.



PLAN:

1. The patient will be started on risperidone 2 mg at bedtime.

2. _____ at bedtime.

3. The patient is not an imminent danger to self or others.

4. Provide the patient with reality orientation.









  ______________________________________________

  Jerri Jensen M.D.





DR:  BELINDA

D:  06/12/2019 23:44

T:  06/13/2019 13:11

JOB#:  [id number]

CC:

## 2019-06-13 NOTE — HISTORY AND PHYSICAL REPORT
DATE OF ADMISSION:  06/11/2019

HISTORY OF PRESENT ILLNESS:  The patient admitted for hypertension and

altered mental status and also low potassium and azotemia, leg pain and

borderline troponin.  The patient does complain of left leg pain and edema

for 1 week.  Denies nausea, vomiting, or diarrhea.  The patient is

homeless.  Denies shortness of breath.  Denies fever or chills.  Denies

chest pain.  Denies cough.



PAST MEDICAL HISTORY:  Significant for hypertension, BPH, GERD, psychosis,

pedal edema, history of pleural effusion.



PAST SURGICAL HISTORY:  Appendectomy.



SOCIAL HISTORY:  History of smoking and drug abuse.  The patient is

homeless.



MEDICATIONS:  None.



ALLERGIES:  No known allergies.



FAMILY HISTORY:  Noncontributory.



REVIEW OF SYSTEMS:  HEENT:  Denies headaches.  RESPIRATORY:  Denies

shortness of breath.  Denies cough.  CARDIOVASCULAR:  No chest pain.  No

orthopnea.  GASTROINTESTINAL:  Denies nausea, vomiting, or diarrhea.

EXTREMITIES:  Reports left leg pain and left leg swelling.  CENTRAL

NERVOUS SYSTEM:  No change in speech pattern.



PHYSICAL EXAMINATION:

VITAL SIGNS:  Temperature 98.2, pulse is 102, blood pressure

130/80.

HEENT:  PERRLA.

NECK:  Supple.  No lymphadenopathy.

CHEST:  Clear to auscultation.

CARDIOVASCULAR:  Regular rate and rhythm.  No murmurs or extra sounds.

GASTROINTESTINAL:  Soft, nontender, nondistended.  No organomegaly.

EXTREMITIES:  1+ pitting edema especially on the left leg.

CENTRAL NERVOUS SYSTEM:  No change in speech pattern.  He is able to moves

all extremities.  No cellulitis at this point.



LABORATORY DATA:  WBC of 8.7, hemoglobin 12.2, platelets of 241.  Sodium

138, potassium 3.2, BUN of 31, creatinine 1.4, glucose of 130.



ASSESSMENT AND PLAN:  Altered mental status, hypertension, low potassium,

azotemia, left leg pain and swelling, borderline troponin.  I have asked

Dr. Jensen, Dr. Kennedy, Dr. Amador, Dr. Mccray, Dr. Reginaldo Amado

to see the patient for the above-mentioned diagnoses and to rule out

DVT.









  ______________________________________________

  Ji Kirk M.D.





DR:  SNEHA

D:  06/12/2019 21:40

T:  06/13/2019 00:05

JOB#:  [id number]

CC:

## 2019-06-13 NOTE — NUR
NURSE NOTES:

Per Md Kirk, D/C planning for SNF [facility] at [phone], nurse Jennifer made 
aware.

## 2019-06-13 NOTE — HEMATOLOGY/ONC PROGRESS NOTE
Assessment/Plan


Assessment/Plan





Assessment and Recs:


# Anemia of iron deficiency due to underlying chronic medical issues, 

multifactorial 


--> Anemia workup has been ordered, rule out gi bleed 


--> No evidence of hemolysis is noted, peripheral smear has been reviewed.


--> Hgb goal >7. Transfuse prn.


--> Iron IV has been started x 5 doses total


--> Medications have been reviewed


--> low threshold for gi evaluation in case has occult +


--> gi eval as required


# Cellulitis of bilateral lower extremities. mild edema. no abscess. was tender 

on admission but now improved.  +psych history.


--> abx as per id


--> surgical recs prn


--> skin protectant to both legs


# CHF with pedal edema


--> as per cards


# Pleural effusion 


--> diuresis on prn lasix


# Pedal edema





The timing of this note does not necessarily reflect the time of the patient 

was seen.





GREATLY APPRECIATE CONSULTATION.





Subjective


Allergies:  


Coded Allergies:  


     No Known Allergies (Unverified , 2/2/18)


Subjective


6/13: Pt in bed resting, no active s/s cardiac, no respiratory distress





Objective


Objective





Current Medications








 Medications


  (Trade)  Dose


 Ordered  Sig/Nighat


 Route


 PRN Reason  Start Time


 Stop Time Status Last Admin


Dose Admin


 


 Acetaminophen


  (Tylenol)  650 mg  Q4H  PRN


 ORAL


 Mild Pain/Temp > 100.5  6/11/19 19:07


 7/11/19 19:06  6/12/19 13:41


 


 


 Albuterol/


 Ipratropium


  (Albuterol/


 Ipratropium)  3 ml  Q4H  PRN


 HHN


 Shortness of Breath  6/11/19 19:10


 6/16/19 19:09  6/12/19 11:03


 


 


 Aspirin


  (ASA)  325 mg  DAILY


 ORAL


   6/12/19 15:00


 7/12/19 14:59  6/13/19 09:16


 


 


 Benztropine


 Mesylate


  (Cogentin)  2 mg  BEDTIME


 ORAL


   6/12/19 21:00


 7/12/19 20:59  6/12/19 20:19


 


 


 Carvedilol


  (Coreg)  3.125 mg  EVERY 12  HOURS


 ORAL


   6/12/19 21:00


 7/12/19 20:59  6/13/19 09:15


 


 


 Clonidine HCl


  (Catapres tab)  0.2 mg  QIDPRN  PRN


 ORAL


 high blood pressure  6/12/19 21:30


 7/12/19 21:29   


 


 


 Digoxin


  (Lanoxin)  0.125 mg  DAILY


 ORAL


   6/13/19 09:00


 7/13/19 08:59  6/13/19 09:16


 


 


 Docusate Sodium


  (Colace)  100 mg  THREE TIMES A  DAY


 ORAL


   6/12/19 09:00


 7/12/19 08:59  6/13/19 09:16


 


 


 Enalapril Maleate


  (Vasotec)  2.5 mg  DAILY


 ORAL


   6/13/19 09:00


 7/13/19 08:59  6/13/19 09:16


 


 


 Famotidine


  (Pepcid)  20 mg  Q12HR


 ORAL


   6/11/19 21:00


 7/11/19 20:59  6/13/19 09:16


 


 


 Furosemide


  (Lasix)  20 mg  DAILY


 IV


   6/13/19 09:00


 7/13/19 08:59  6/13/19 09:15


 


 


 Gabapentin


  (Neurontin)  100 mg  THREE TIMES A  DAY


 ORAL


   6/12/19 14:45


 7/12/19 14:44  6/13/19 09:16


 


 


 Hydralazine HCl


  (Apresoline)  10 mg  Q6HR


 ORAL


   6/12/19 18:00


 7/12/19 17:59  6/13/19 05:01


 


 


 Isosorbide


 Mononitrate


  (Imdur)  30 mg  DAILY


 ORAL


   6/12/19 14:49


 7/12/19 14:48  6/13/19 09:15


 


 


 Nitroglycerin


  (Ntg)  1 patch  Q24H


 TDERMAL


   6/11/19 20:00


 7/11/19 19:59  6/12/19 20:20


 


 


 Potassium Chloride


  (K-Dur)  40 meq  Q12HR


 ORAL


   6/11/19 21:00


 7/11/19 20:59  6/13/19 09:16


 


 


 Risperidone


  (RisperDAL)  2 mg  QHS


 ORAL


   6/11/19 21:00


 7/11/19 20:59  6/12/19 20:19


 


 


 Tamsulosin HCl


  (Flomax)  0.4 mg  BEDTIME


 ORAL


   6/11/19 21:00


 7/11/19 20:59  6/12/19 20:19


 


 


 Tramadol HCl


  (Ultram)  25 mg  Q6H  PRN


 ORAL


 pain 6 and above  6/11/19 19:07


 6/18/19 19:06   


 











Last 24 Hour Vital Signs








  Date Time  Temp Pulse Resp B/P (MAP) Pulse Ox O2 Delivery O2 Flow Rate FiO2


 


6/13/19 09:16    146/101    


 


6/13/19 09:16  103      


 


6/13/19 09:15  103  146/101    


 


6/13/19 09:15    146/101    


 


6/13/19 09:00      Room Air  


 


6/13/19 08:05  103 19  96 Room Air  21


 


6/13/19 08:00 97.9 114 19 146/101 (116) 96   


 


6/13/19 05:01    133/77    


 


6/13/19 04:00 97.7 102 19 133/77 (95) 96   


 


6/13/19 00:00 96.6 111 20 139/86 (103) 96   


 


6/12/19 23:34    126/60    


 


6/12/19 21:35  104 20  97 Room Air  21


 


6/12/19 21:27  107  120/71    


 


6/12/19 21:00      Room Air  


 


6/12/19 20:20    120/71    


 


6/12/19 20:00 96.6 107 19 120/71 (87) 96   


 


6/12/19 20:00  102      


 


6/12/19 17:11    131/80    


 


6/12/19 17:11  102      


 


6/12/19 16:00 98.2 102 21 131/80 (97) 98   


 


6/12/19 13:44    132/79    


 


6/12/19 12:00 98.6 111 20 147/123 (131) 99   


 


6/12/19 11:14  116 20  100 Room Air  21


 


6/12/19 11:00  117 22  100 Room Air  21


 


6/12/19 09:00      Room Air  


 


6/12/19 08:00 98.0 110 21 156/115 (129) 99   


 


6/12/19 06:25    147/97    


 


6/12/19 04:00 98.3 112 22 147/97 (114) 99   


 


6/12/19 00:00 97.1 117 20 152/100 (117) 94   


 


6/11/19 22:16    165/95    


 


6/11/19 21:00      Room Air  


 


6/11/19 20:58    176/101    


 


6/11/19 20:00 97.8 117 22 176/101 (126) 94   


 


6/11/19 20:00  118      


 


6/11/19 20:00    170/101    


 


6/11/19 19:49  103 18  98 Room Air  21


 


6/11/19 18:50    171/119    


 


6/11/19 17:23      Room Air  


 


6/11/19 17:00 97.1 116 20 171/119 (136) 99   


 


6/11/19 16:46 98.1 105 18 162/100 98 Room Air  


 


6/11/19 16:29  105 18 162/100 98 Room Air  


 


6/11/19 15:23  110 20 166/100 92 Room Air  


 


6/11/19 14:38  115 18  99 Room Air  


 


6/11/19 14:23  112 22 176/94 89 Room Air  


 


6/11/19 12:41 98.1 114 18 146/82 100 Room Air  


 


6/11/19 12:30 97.2 120 20 158/75 (102) 98 Room Air  

















Intake and Output  


 


 6/12/19 6/13/19





 19:00 07:00


 


Intake Total 800 ml 240 ml


 


Balance 800 ml 240 ml


 


  


 


Intake Oral 800 ml 240 ml


 


# Voids 3 2











Labs








Test


  6/11/19


12:54 6/11/19


13:00 6/11/19


13:05 6/11/19


14:00


 


White Blood Count


  8.7 K/UL


(4.8-10.8) 


  


  


 


 


Red Blood Count


  4.04 M/UL


(4.70-6.10) 


  


  


 


 


Hemoglobin


  12.2 G/DL


(14.2-18.0) 


  


  


 


 


Hematocrit


  37.1 %


(42.0-52.0) 


  


  


 


 


Mean Corpuscular Volume 92 FL (80-99)    


 


Mean Corpuscular Hemoglobin


  30.3 PG


(27.0-31.0) 


  


  


 


 


Mean Corpuscular Hemoglobin


Concent 33.0 G/DL


(32.0-36.0) 


  


  


 


 


Red Cell Distribution Width


  13.6 %


(11.6-14.8) 


  


  


 


 


Platelet Count


  241 K/UL


(150-450) 


  


  


 


 


Mean Platelet Volume


  6.2 FL


(6.5-10.1) 


  


  


 


 


Neutrophils (%) (Auto)


  77.4 %


(45.0-75.0) 


  


  


 


 


Lymphocytes (%) (Auto)


  13.4 %


(20.0-45.0) 


  


  


 


 


Monocytes (%) (Auto)


  7.5 %


(1.0-10.0) 


  


  


 


 


Eosinophils (%) (Auto)


  0.9 %


(0.0-3.0) 


  


  


 


 


Basophils (%) (Auto)


  0.8 %


(0.0-2.0) 


  


  


 


 


Iron Level


  43 ug/dL


() 


  


  


 


 


Total Iron Binding Capacity


  297 ug/dL


(250-450) 


  


  


 


 


Percent Iron Saturation 14 % (15-50)    


 


Unsaturated Iron Binding


  254 ug/dL


(112-346) 


  


  


 


 


Ferritin


  65 NG/ML


(8-388) 


  


  


 


 


Troponin I


  0.060 ng/mL


(0.000-0.056) 


  


  0.065 ng/mL


(0.000-0.056)


 


Pro-B-Type Natriuretic Peptide


  4849 pg/mL


(0-125) 


  


  


 


 


Serum Alcohol < 3 mg/dL    


 


Urine Color  Yellow   


 


Urine Appearance  Clear   


 


Urine pH  5 (4.5-8.0)   


 


Urine Specific Gravity


  


  1.025


(1.005-1.035) 


  


 


 


Urine Protein  3+ (NEGATIVE)   


 


Urine Glucose (UA)


  


  Negative


(NEGATIVE) 


  


 


 


Urine Ketones


  


  Negative


(NEGATIVE) 


  


 


 


Urine Blood  2+ (NEGATIVE)   


 


Urine Nitrite


  


  Negative


(NEGATIVE) 


  


 


 


Urine Bilirubin


  


  Negative


(NEGATIVE) 


  


 


 


Urine Urobilinogen


  


  Normal MG/DL


(0.0-1.0) 


  


 


 


Urine Leukocyte Esterase


  


  Negative


(NEGATIVE) 


  


 


 


Urine RBC


  


  5-10 /HPF (0 -


0) 


  


 


 


Urine WBC


  


  0-2 /HPF (0 -


0) 


  


 


 


Urine Squamous Epithelial


Cells 


  Occasional


/LPF 


  


 


 


Urine Bacteria


  


  Occasional


/HPF (NONE) 


  


 


 


Urine Opiates Screen


  


  Negative


(NEGATIVE) 


  


 


 


Urine Barbiturates Screen


  


  Negative


(NEGATIVE) 


  


 


 


Phencyclidine (PCP) Screen


  


  Negative


(NEGATIVE) 


  


 


 


Urine Amphetamines Screen


  


  Negative


(NEGATIVE) 


  


 


 


Urine Benzodiazepines Screen


  


  Negative


(NEGATIVE) 


  


 


 


Urine Cocaine Screen


  


  Negative


(NEGATIVE) 


  


 


 


Urine Marijuana (THC) Screen


  


  Positive


(NEGATIVE) 


  


 


 


Prothrombin Time


  


  


  10.9 SEC


(9.30-11.50) 


 


 


Prothromb Time International


Ratio 


  


  1.0 (0.9-1.1) 


  


 


 


Activated Partial


Thromboplast Time 


  


  25 SEC (23-33) 


  


 


 


D-Dimer


  


  


  0.93 mg/L FEU


(0.00-0.49) 


 


 


Total Creatine Kinase


  


  


  


  363 U/L


()


 


Test


  6/11/19


14:35 6/12/19


05:25 6/13/19


05:35 


 


 


Sodium Level


  138 MMOL/L


(136-145) 142 MMOL/L


(136-145) 139 MMOL/L


(136-145) 


 


 


Potassium Level


  3.2 MMOL/L


(3.5-5.1) 3.6 MMOL/L


(3.5-5.1) 4.1 MMOL/L


(3.5-5.1) 


 


 


Chloride Level


  105 MMOL/L


() 110 MMOL/L


() 106 MMOL/L


() 


 


 


Carbon Dioxide Level


  25 MMOL/L


(21-32) 24 MMOL/L


(21-32) 24 MMOL/L


(21-32) 


 


 


Anion Gap


  8 mmol/L


(5-15) 9 mmol/L


(5-15) 9 mmol/L


(5-15) 


 


 


Blood Urea Nitrogen


  31 mg/dL


(7-18) 29 mg/dL


(7-18) 25 mg/dL


(7-18) 


 


 


Creatinine


  1.4 MG/DL


(0.55-1.30) 1.4 MG/DL


(0.55-1.30) 1.4 MG/DL


(0.55-1.30) 


 


 


Estimat Glomerular Filtration


Rate > 60 mL/min


(>60) > 60 mL/min


(>60) > 60 mL/min


(>60) 


 


 


Glucose Level


  130 MG/DL


() 125 MG/DL


() 152 MG/DL


() 


 


 


Calcium Level


  8.4 MG/DL


(8.5-10.1) 8.2 MG/DL


(8.5-10.1) 8.3 MG/DL


(8.5-10.1) 


 


 


Total Bilirubin


  0.5 MG/DL


(0.2-1.0) 0.3 MG/DL


(0.2-1.0) 0.2 MG/DL


(0.2-1.0) 


 


 


Aspartate Amino Transf


(AST/SGOT) 49 U/L (15-37) 


  42 U/L (15-37) 


  52 U/L (15-37) 


  


 


 


Alanine Aminotransferase


(ALT/SGPT) 90 U/L (12-78) 


  75 U/L (12-78) 


  76 U/L (12-78) 


  


 


 


Alkaline Phosphatase


  82 U/L


() 65 U/L


() 67 U/L


() 


 


 


Total Protein


  6.0 G/DL


(6.4-8.2) 5.2 G/DL


(6.4-8.2) 5.9 G/DL


(6.4-8.2) 


 


 


Albumin


  3.1 G/DL


(3.4-5.0) 2.6 G/DL


(3.4-5.0) 2.9 G/DL


(3.4-5.0) 


 


 


Globulin 2.9 g/dL  2.6 g/dL  3.0 g/dL  


 


Albumin/Globulin Ratio 1.1 (1.0-2.7)  1.0 (1.0-2.7)  1.0 (1.0-2.7)  


 


White Blood Count


  


  7.6 K/UL


(4.8-10.8) 7.7 K/UL


(4.8-10.8) 


 


 


Red Blood Count


  


  3.68 M/UL


(4.70-6.10) 3.79 M/UL


(4.70-6.10) 


 


 


Hemoglobin


  


  10.8 G/DL


(14.2-18.0) 11.3 G/DL


(14.2-18.0) 


 


 


Hematocrit


  


  33.4 %


(42.0-52.0) 34.4 %


(42.0-52.0) 


 


 


Mean Corpuscular Volume  91 FL (80-99)  91 FL (80-99)  


 


Mean Corpuscular Hemoglobin


  


  29.5 PG


(27.0-31.0) 29.8 PG


(27.0-31.0) 


 


 


Mean Corpuscular Hemoglobin


Concent 


  32.5 G/DL


(32.0-36.0) 32.8 G/DL


(32.0-36.0) 


 


 


Red Cell Distribution Width


  


  13.6 %


(11.6-14.8) 13.4 %


(11.6-14.8) 


 


 


Platelet Count


  


  199 K/UL


(150-450) 206 K/UL


(150-450) 


 


 


Mean Platelet Volume


  


  6.0 FL


(6.5-10.1) 6.9 FL


(6.5-10.1) 


 


 


Neutrophils (%) (Auto)


  


  75.5 %


(45.0-75.0) 79.4 %


(45.0-75.0) 


 


 


Lymphocytes (%) (Auto)


  


  13.9 %


(20.0-45.0) 11.6 %


(20.0-45.0) 


 


 


Monocytes (%) (Auto)


  


  8.2 %


(1.0-10.0) 6.3 %


(1.0-10.0) 


 


 


Eosinophils (%) (Auto)


  


  1.2 %


(0.0-3.0) 1.5 %


(0.0-3.0) 


 


 


Basophils (%) (Auto)


  


  1.2 %


(0.0-2.0) 1.2 %


(0.0-2.0) 


 


 


Hemoglobin A1c


  


  7.0 %


(4.3-6.0) 


  


 


 


Uric Acid


  


  6.9 MG/DL


(2.6-7.2) 6.8 MG/DL


(2.6-7.2) 


 


 


Phosphorus Level


  


  3.2 MG/DL


(2.5-4.9) 2.8 MG/DL


(2.5-4.9) 


 


 


Magnesium Level


  


  1.6 MG/DL


(1.8-2.4) 1.8 MG/DL


(1.8-2.4) 


 


 


Gamma Glutamyl Transpeptidase  80 U/L (5-85)   


 


Ammonia


  


  44 umol/L


(11-32) 


  


 


 


Total Creatine Kinase


  


  387 U/L


() 475 U/L


() 


 


 


Troponin I


  


  0.090 ng/mL


(0.000-0.056) 


  


 


 


C-Reactive Protein,


Quantitative 


  < 0.4 mg/dL


(0.00-0.90) < 0.4 mg/dL


(0.00-0.90) 


 


 


Pro-B-Type Natriuretic Peptide


  


  4113 pg/mL


(0-125) 2205 pg/mL


(0-125) 


 


 


Triglycerides Level


  


  42 MG/DL


() 


  


 


 


Cholesterol Level


  


  112 MG/DL (<


200) 


  


 


 


LDL Cholesterol


  


  73 mg/dL


(<100) 


  


 


 


HDL Cholesterol


  


  30 MG/DL


(40-60) 


  


 


 


Cholesterol/HDL Ratio  3.7 (3.3-4.4)   


 


Vitamin B12 Level


  


  421 PG/ML


(193-986) 


  


 


 


Folate


  


  16.7 NG/ML


(8.6-58.9) 


  


 


 


Thyroid Stimulating Hormone


(TSH) 


  2.555 uiU/mL


(0.358-3.740) 


  


 


 


Digoxin Level


  


  


  < 0.2 NG/ML


(0.5-2.0) 


 








Height (Feet):  5


Height (Inches):  9.00


Weight (Pounds):  182











Reginaldo Amado MD Jun 13, 2019 09:54

## 2019-06-13 NOTE — NUR
NURSE NOTES:  Received patient from [facility]ortun[facility].  Patient on room air, no signs of respiratory 
distress.  Alert and oriented x2 to self and place.  Patient does know that it's 2019.  
Edema on BLE.  No c/o pain.  Replaced leads on patient's cardiac monitor, patient pulled it 
off a few minutes later and refused.  Bed is locked in low position, call light within 
reach.

## 2019-06-13 NOTE — CDS PHYSICIAN QUERY
Clarification is required for compliance, coding accuracy, and to reflect 
severity of illness for this patient



Dear Dr. Villalobos______________________________   Date: ___________________



/CDS Name: Kamala ______________________   





"Heart Failure / CHF" documented in Cardiology progress note"1. Most likely 
acute congestive heart failure.  We will require to review 2D echocardiography 
for LV systolic function and hemodynamics data from the diastolic function 
evaluation."



Echo(6/11):Left ventricular ejection fraction estimated to be  20 %



proBNP of 4849





Please Clarify:



Acuity

   

[] Acute   

[] Chronic   

[] Acute on Chronic



Type  

      

[] Systolic

[] Diastolic         

[] Systolic & Diastolic (Combined)

[] Other: _____________________________







Present on Admission:  []  Yes          []  No         []  Clinically 
Undetermined





_________________                                    _____________

Physician signature                                       Date
MTDD

## 2019-06-13 NOTE — CONSULTATION
DATE OF CONSULTATION:  06/12/2019

CARDIOLOGY CONSULTATION



CONSULTING PHYSICIAN:  Lavon Amador M.D.



REFERRING PHYSICIAN:  Ji Kirk M.D.



REASON FOR CONSULTATION:  Management of cardiomyopathy/acute congestive

heart failure.



HISTORY OF PRESENT ILLNESS:  The patient is a very unfortunate 58-year-old

gentleman, who is a poor historian and was found to have bilateral lower

extremity pain and screaming in the street.  The patient is not

communicating with me at this time.  At the time of arrival to this

hospital, his blood pressure was 158/75 mmHg and heart rate of 120.  A

12-lead electrocardiogram was significant for sinus tachycardia at the

rate of 113 with left atrial enlargement, low voltage, and nonspecific ST

and T-wave abnormalities.  QT interval was measured at 400 millisecond.



His past medical history is not known as he is not providing history and

he is currently homeless.



In the emergency department, the patient had chest x-ray, which showed

pulmonary vascular congestion with cardiomegaly and bilateral pleural

effusion consistent with heart failure.



A 12-lead electrocardiogram was significant for elevation of troponin I

level at 0.06 as well as proBNP of 4849.  The patient also was noted to

have elevation of BUN and creatinine at 31 and 1.4 respectively as well as

low potassium level at 3.2.  His magnesium level was low at 1.6.  There

was still slight elevation of liver function tests.



The patient was admitted to telemetry unit for further evaluation and

management of possible acute heart failure.  Cardiology consultation was

made at the request of Dr. Kirk.



PAST MEDICAL HISTORY:  Unknown.



PAST SURGICAL HISTORY:  None.



LIST OF MEDICATIONS:  Ibuprofen 600 mg q.6 hours p.r.n. pain.  The rest of

the medication are unknown.



FAMILY HISTORY:  No premature coronary artery disease in the first-degree

relatives.



HABITS:  Unknown.



REVIEW OF SYSTEMS:  HEENT:  Denies any headache, diplopia, or blurred

vision.  CONSTITUTIONAL:  Denies any fever, chills, night sweats, or

weight loss.  CARDIOVASCULAR:  Positive for shortness of breath.  No chest

pain.  No PND, orthopnea, or leg swelling.  PULMONARY:  Denies any cough,

hemoptysis, or wheezing.  GASTROINTESTINAL:  Denies any nausea, vomiting,

diarrhea, constipation, abdominal pain, or GI bleed.  GENITOURINARY:

Denies any hematuria, dysuria, or incontinence.  NEUROLOGY:  Denies any

motor dysfunction, sensory deficit, or altered speech.  MUSCULOSKELETAL:

Complaining and screaming with the bilateral lower extremity pain.



PHYSICAL EXAMINATION:

VITAL SIGNS:  At the time of arrival to the hospital, blood pressure was

158/74, respirations of 20, pulse of 120, temperature 97.3 degrees

Fahrenheit, and O2 saturation of 98% on room air.

GENERAL:  An unfortunate 58-year-old gentleman, who is somewhat

lethargic.

HEENT:  Atraumatic and normocephalic.  Anicteric.  Pupils are equal, round,

and reactive to light and accommodation.  Extraocular muscles intact.

NECK:  JVP is about 12 to 15 cm.  No carotid bruit.  Carotid upstroke is 2+

bilaterally.

CARDIOVASCULAR:  Normal S1, S2.  Regular rate and rhythm.  A 2/6 mid

systolic murmur at the left sternal border.  PMI is at fourth intercostal

space in the midclavicular.

LUNGS:  Diminished breath sounds in both bases plus crackles.

ABDOMEN:  Soft, nontender, and nondistended.  No hepatosplenomegaly.

Positive bowel sounds.

EXTREMITIES:  No evidence of edema, clubbing, or cyanosis.



LABORATORY FINDINGS:  Urine drug screen showed positive marijuana.



CBC showed WBC of 8.7, hemoglobin 12.2, hematocrit 37.1, and platelet

count is 241,000.



Chemistry showed troponin I 0.06, proBNP of 4849, sodium is 138,

potassium is 3.2, chloride 105, bicarbonate 25, BUN of 31, creatinine 1.4,

and glucose is 130.  Hemoglobin A1c was 7.0.  Calcium is 8.4.  AST 49 and

ALT 90.



ASSESSMENT/PLAN:  The patient is a very unfortunate 58-year-old gentleman

seen in Cardiology consultation.



1. Most likely acute congestive heart failure.  We will require to

review 2D echocardiography for LV systolic function and hemodynamics data

from the diastolic function evaluation.

The patient will probably benefit from intravenous diuretics for

afterload reduction.

Once echocardiography was reviewed, we will treat accordingly.

2. Slight elevation of troponin I level.  It could be secondary to type

2 non-ST elevation myocardial infarction versus myocarditis.

3. Possible diabetes mellitus given hemoglobin A1c of 7.1.

4. History of hypertension.



I would like to thank, Dr. Kirk, for allowing me to participate in

care of this patient.









  ______________________________________________

  Lavon Amador M.D.





DR:  ELOY

D:  06/12/2019 20:46

T:  06/13/2019 03:13

JOB#:  [id number]

CC:

## 2019-06-13 NOTE — NEPHROLOGY PROGRESS NOTE
Assessment/Plan


Problem List:  


(1) CHF (congestive heart failure)


Assessment:  cardiomyopathy





(2) Acute encephalopathy


(3) Psychosis


(4) Hypertension


(5) Diabetic nephropathy


Assessment





HTN OOC


Proteinuria / HypoAlbuminemia / Edematous state


Cardiomyopathy : Low Ej Fx


Diabetes ; High A1c


CHF


Renal failure


Anemia


Plan


plan;





Slow diurese


BP control


BS control


Afterload & Preload reduction


Inotropic Rx


monitor lytes and renal parameters


per orders





Subjective


ROS Limited/Unobtainable:  No


Constitutional:  Reports: malaise, weakness





Objective


Objective





Last 24 Hour Vital Signs








  Date Time  Temp Pulse Resp B/P (MAP) Pulse Ox O2 Delivery O2 Flow Rate FiO2


 


6/13/19 13:22    125/81    


 


6/13/19 12:00  98      


 


6/13/19 12:00 98.1 108 20 125/81 (96) 93   


 


6/13/19 09:16    146/101    


 


6/13/19 09:16  103      


 


6/13/19 09:15  103  146/101    


 


6/13/19 09:15    146/101    


 


6/13/19 09:00  111      


 


6/13/19 09:00      Room Air  


 


6/13/19 08:05  103 19  96 Room Air  21


 


6/13/19 08:00 97.9 114 19 146/101 (116) 96   


 


6/13/19 05:01    133/77    


 


6/13/19 04:00 97.7 102 19 133/77 (95) 96   


 


6/13/19 00:00 96.6 111 20 139/86 (103) 96   


 


6/12/19 23:34    126/60    


 


6/12/19 21:35  104 20  97 Room Air  21


 


6/12/19 21:27  107  120/71    


 


6/12/19 21:00      Room Air  


 


6/12/19 20:20    120/71    


 


6/12/19 20:00 96.6 107 19 120/71 (87) 96   


 


6/12/19 20:00  102      


 


6/12/19 17:11    131/80    


 


6/12/19 17:11  102      


 


6/12/19 16:00 98.2 102 21 131/80 (97) 98   

















Intake and Output  


 


 6/12/19 6/13/19





 19:00 07:00


 


Intake Total 800 ml 240 ml


 


Balance 800 ml 240 ml


 


  


 


Intake Oral 800 ml 240 ml


 


# Voids 3 2








Laboratory Tests


6/13/19 05:35: 


White Blood Count 7.7, Red Blood Count 3.79L, Hemoglobin 11.3L, Hematocrit 34.4L

, Mean Corpuscular Volume 91, Mean Corpuscular Hemoglobin 29.8, Mean 

Corpuscular Hemoglobin Concent 32.8, Red Cell Distribution Width 13.4, Platelet 

Count 206, Mean Platelet Volume 6.9, Neutrophils (%) (Auto) 79.4H, Lymphocytes (

%) (Auto) 11.6L, Monocytes (%) (Auto) 6.3, Eosinophils (%) (Auto) 1.5, 

Basophils (%) (Auto) 1.2, Sodium Level 139, Potassium Level 4.1, Chloride Level 

106, Carbon Dioxide Level 24, Anion Gap 9, Blood Urea Nitrogen 25H, Creatinine 

1.4H, Estimat Glomerular Filtration Rate > 60, Glucose Level 152H, Uric Acid 6.8

, Calcium Level 8.3L, Phosphorus Level 2.8, Magnesium Level 1.8, Total 

Bilirubin 0.2, Aspartate Amino Transf (AST/SGOT) 52H, Alanine Aminotransferase (

ALT/SGPT) 76, Alkaline Phosphatase 67, Total Creatine Kinase 475H, C-Reactive 

Protein, Quantitative < 0.4, Pro-B-Type Natriuretic Peptide 2205H, Total 

Protein 5.9L, Albumin 2.9L, Globulin 3.0, Albumin/Globulin Ratio 1.0, Digoxin 

Level < 0.2L


Height (Feet):  5


Height (Inches):  9.00


Weight (Pounds):  182


Cardiovascular:  tachycardia


Respiratory/Chest:  decreased breath sounds


Abdomen:  distended


Extremities:  other - edematous











Scott Mccray MD Jun 13, 2019 14:36

## 2019-06-13 NOTE — DIAGNOSTIC IMAGING REPORT
Indications: Altered mental status

 

Technique: Spiral acquisitions obtained through the brain. Angled axial and coronal 5

x 5 mm slices were reconstructed. Total dose length product 1432.06 mGycm.  CTDI

vol(s) 70.38 mGy. Dose reduction achieved using automated exposure control

 

Comparison: None.

 

Findings: There is suggestion of mild generalized edema of the scalp. No acute

intracranial hemorrhage or edema, mass effect, nor midline shift. There is an old

lacunar infarct in the left internal capsule and another tiny one in the left

thalamus. There is questionably low-attenuation in the basal ganglia lateral to the

hypothalamus on the left. There is a small area of low-attenuation in the left

posterior parasagittal parietal lobe just above the occipital lobe, and another in

the upper left cerebellar hemisphere.. There is disease within the frontal sinuses on

the left. Visualized orbits are unremarkable. The calvarium is intact. The gray-white

differentiation is normal. Normal size ventricles and extra axial CSF spaces. The

mastoids are clear. The calvarium is intact

 

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

parietal old infarct, lacunar infarct in the left internal capsule, left cerebellar

hemisphere lacunar infarct

 

Very questionable low-attenuation lateral to the hypothalamus, could indicate a

subacute or old infarct.

 

Negative for acute intracranial bleed or mass effect

 

Evidence of diffuse mild scalp edema.

 

Frontal sinus disease

 

 

 

 

 

The CT scanner at [facility] is accredited by the [employer]

[employer]iology and the scans are performed using protocols designed to limit radiation

exposure to as low as reasonably achievable to attain images of sufficient resolution

adequate for diagnostic evaluation.

## 2019-06-13 NOTE — CARDIOLOGY PROGRESS NOTE
Assessment/Plan


Assessment/Plan


1. Acute systolic and diastolic CHF, LVEF at 30%,  continue guideline directed 

medical therapy.  Will start aldactone.


2. Slight elevation of troponin I level.  It could be secondary to type2 non-ST 

elevation myocardial infarction versus myocarditis.


3. New onset diabetes mellitus given hemoglobin A1c of 7.1.


4. History of hypertension.





Subjective


Subjective


Sinus tachycardia at rate of 113.





Objective





Last 24 Hour Vital Signs








  Date Time  Temp Pulse Resp B/P (MAP) Pulse Ox O2 Delivery O2 Flow Rate FiO2


 


6/13/19 23:00    149/99    


 


6/13/19 22:59  113  149/99 (116)    


 


6/13/19 20:30  113  167/116    


 


6/13/19 20:28    167/116    


 


6/13/19 20:00 98.3 113 24 167/116 (133) 96   


 


6/13/19 19:30  102 20  97 Room Air  21


 


6/13/19 18:35    158/83    


 


6/13/19 16:00 98.4 105 20 158/83 (108) 99   


 


6/13/19 13:22    125/81    


 


6/13/19 12:00  98      


 


6/13/19 12:00 98.1 108 20 125/81 (96) 93   


 


6/13/19 09:16    146/101    


 


6/13/19 09:16  103      


 


6/13/19 09:15  103  146/101    


 


6/13/19 09:15    146/101    


 


6/13/19 09:00  111      


 


6/13/19 09:00      Room Air  


 


6/13/19 08:05  103 19  96 Room Air  21


 


6/13/19 08:00 97.9 114 19 146/101 (116) 96   


 


6/13/19 05:01    133/77    


 


6/13/19 04:00 97.7 102 19 133/77 (95) 96   


 


6/13/19 00:00 96.6 111 20 139/86 (103) 96   

















Intake and Output  


 


 6/12/19 6/13/19





 19:00 07:00


 


Intake Total 800 ml 240 ml


 


Balance 800 ml 240 ml


 


  


 


Intake Oral 800 ml 240 ml


 


# Voids 3 2











Laboratory Tests








Test


  6/13/19


05:35


 


White Blood Count


  7.7 K/UL


(4.8-10.8)


 


Red Blood Count


  3.79 M/UL


(4.70-6.10)  L


 


Hemoglobin


  11.3 G/DL


(14.2-18.0)  L


 


Hematocrit


  34.4 %


(42.0-52.0)  L


 


Mean Corpuscular Volume 91 FL (80-99)  


 


Mean Corpuscular Hemoglobin


  29.8 PG


(27.0-31.0)


 


Mean Corpuscular Hemoglobin


Concent 32.8 G/DL


(32.0-36.0)


 


Red Cell Distribution Width


  13.4 %


(11.6-14.8)


 


Platelet Count


  206 K/UL


(150-450)


 


Mean Platelet Volume


  6.9 FL


(6.5-10.1)


 


Neutrophils (%) (Auto)


  79.4 %


(45.0-75.0)  H


 


Lymphocytes (%) (Auto)


  11.6 %


(20.0-45.0)  L


 


Monocytes (%) (Auto)


  6.3 %


(1.0-10.0)


 


Eosinophils (%) (Auto)


  1.5 %


(0.0-3.0)


 


Basophils (%) (Auto)


  1.2 %


(0.0-2.0)


 


Sodium Level


  139 MMOL/L


(136-145)


 


Potassium Level


  4.1 MMOL/L


(3.5-5.1)


 


Chloride Level


  106 MMOL/L


()


 


Carbon Dioxide Level


  24 MMOL/L


(21-32)


 


Anion Gap


  9 mmol/L


(5-15)


 


Blood Urea Nitrogen


  25 mg/dL


(7-18)  H


 


Creatinine


  1.4 MG/DL


(0.55-1.30)  H


 


Estimat Glomerular Filtration


Rate > 60 mL/min


(>60)


 


Glucose Level


  152 MG/DL


()  H


 


Uric Acid


  6.8 MG/DL


(2.6-7.2)


 


Calcium Level


  8.3 MG/DL


(8.5-10.1)  L


 


Phosphorus Level


  2.8 MG/DL


(2.5-4.9)


 


Magnesium Level


  1.8 MG/DL


(1.8-2.4)


 


Total Bilirubin


  0.2 MG/DL


(0.2-1.0)


 


Aspartate Amino Transf


(AST/SGOT) 52 U/L (15-37)


H


 


Alanine Aminotransferase


(ALT/SGPT) 76 U/L (12-78)


 


 


Alkaline Phosphatase


  67 U/L


()


 


Total Creatine Kinase


  475 U/L


()  H


 


C-Reactive Protein,


Quantitative < 0.4 mg/dL


(0.00-0.90)


 


Pro-B-Type Natriuretic Peptide


  2205 pg/mL


(0-125)  H


 


Total Protein


  5.9 G/DL


(6.4-8.2)  L


 


Albumin


  2.9 G/DL


(3.4-5.0)  L


 


Globulin 3.0 g/dL  


 


Albumin/Globulin Ratio 1.0 (1.0-2.7)  


 


Digoxin Level


  < 0.2 NG/ML


(0.5-2.0)  L








Objective


HEENT:  Atraumatic and normocephalic.  Anicteric.  Pupils are equal, round,


and reactive to light and accommodation.  Extraocular muscles intact.


NECK:  JVP is about 12 to 15 cm.  No carotid bruit.  Carotid upstroke is 2+


bilaterally.


CARDIOVASCULAR:  Normal S1, S2.  Regular rate and rhythm.  A 2/6 mid


systolic murmur at the left sternal border.  PMI is at fourth intercostal


space in the midclavicular.


LUNGS:  Diminished breath sounds in both bases plus crackles.


ABDOMEN:  Soft, nontender, and nondistended.  No hepatosplenomegaly.


Positive bowel sounds.


EXTREMITIES:  No evidence of edema, clubbing, or cyanosis.











Lavon Amador MD Jun 13, 2019 23:50

## 2019-06-13 NOTE — NUR
NURSE NOTES:

Per Dr. Kirk, PT and OT clarification ordered. Order noted, entered, carried out. Will 
continue to monitor.

## 2019-06-14 VITALS — SYSTOLIC BLOOD PRESSURE: 122 MMHG | DIASTOLIC BLOOD PRESSURE: 79 MMHG

## 2019-06-14 VITALS — SYSTOLIC BLOOD PRESSURE: 160 MMHG | DIASTOLIC BLOOD PRESSURE: 96 MMHG

## 2019-06-14 VITALS — DIASTOLIC BLOOD PRESSURE: 88 MMHG | SYSTOLIC BLOOD PRESSURE: 164 MMHG

## 2019-06-14 RX ADMIN — DOCUSATE SODIUM SCH MG: 100 CAPSULE, LIQUID FILLED ORAL at 09:42

## 2019-06-14 RX ADMIN — ENALAPRIL MALEATE SCH MG: 2.5 TABLET ORAL at 09:42

## 2019-06-14 RX ADMIN — HYDRALAZINE HYDROCHLORIDE SCH MG: 10 TABLET ORAL at 05:51

## 2019-06-14 RX ADMIN — DOCUSATE SODIUM SCH MG: 100 CAPSULE, LIQUID FILLED ORAL at 13:00

## 2019-06-14 RX ADMIN — DIGOXIN SCH MG: 0.12 TABLET ORAL at 09:43

## 2019-06-14 RX ADMIN — ISOSORBIDE MONONITRATE SCH MG: 30 TABLET, EXTENDED RELEASE ORAL at 09:43

## 2019-06-14 NOTE — NUR
Homeless :



Patient is being discharged from medical care.  Awake, alert and oriented x 2.  Discuss 
homeless discharge paperwork including resources.  Belonging checklist signed.  Discharge 
paperwork signed. All medical devices such as IV, heart monitor,  and ID band were removed.  
Patient left on gurney with all personal belongings with with [facility] to [facility]. 

-------------------------------------------------------------------------------

Addendum: 06/14/19 at 1627 by Bob Parker RN

-------------------------------------------------------------------------------

Discharge Note:

## 2019-06-14 NOTE — CARDIOLOGY PROGRESS NOTE
Assessment/Plan


Assessment/Plan


1. Acute systolic and diastolic CHF, LVEF at 30%,  continue guideline directed 

medical therapy.  Continue aldactone.


2. Slight elevation of troponin I level.  It could be secondary to type2 non-ST 

elevation myocardial infarction versus myocarditis.


3. New onset diabetes mellitus given hemoglobin A1c of 7.1.


4. History of hypertension.





Subjective


Subjective


Sinus rhythm at rate of 68.





Objective





Last 24 Hour Vital Signs








  Date Time  Temp Pulse Resp B/P (MAP) Pulse Ox O2 Delivery O2 Flow Rate FiO2


 


6/14/19 11:58 98.2 68 20 122/79 (93) 94   


 


6/14/19 09:44  64  164/88    


 


6/14/19 09:43  64      


 


6/14/19 09:43    164/88    


 


6/14/19 09:42    164/88    


 


6/14/19 09:38  64 18  98 Room Air  21


 


6/14/19 09:00      Room Air  


 


6/14/19 08:00 98.5 62 20 164/88 (113) 98   


 


6/14/19 05:51    160/96    


 


6/14/19 04:00 98.2 103 24 160/96 (117) 98   


 


6/14/19 01:39 98.0       


 


6/13/19 23:48 98.0 115 20 162/108 (126) 99   


 


6/13/19 23:34  111      


 


6/13/19 23:00    149/99    


 


6/13/19 22:59  113  149/99 (116)    


 


6/13/19 21:00      Room Air  


 


6/13/19 20:30  113  167/116    


 


6/13/19 20:28    167/116    


 


6/13/19 20:00 98.3 113 24 167/116 (133) 96   


 


6/13/19 19:30  102 20  97 Room Air  21


 


6/13/19 19:24  106      


 


6/13/19 18:35    158/83    


 


6/13/19 16:00 98.4 105 20 158/83 (108) 99   

















Intake and Output  


 


 6/13/19 6/14/19





 19:00 07:00


 


Intake Total 280 ml 600 ml


 


Balance 280 ml 600 ml


 


  


 


Intake Oral 280 ml 600 ml


 


# Voids 3 3


 


# Bowel Movements  1











Microbiology








 Date/Time


Source Procedure


Growth Status


 


 


 6/11/19 16:32


Nasal Nares MRSA Culture - Final


NO METHICILLIN RESISTANT STAPH AUREUS... Complete


 


 6/11/19 16:32


Rectum - Final


NO CARBAPENEM-RESISTANT ENTEROBACTERI... Complete


 


 6/11/19 16:32


Rectum VRE Culture - Final


NO VANCOMYCIN RESISTANT ENTEROCOCCUS ... Complete








Objective


HEENT:  Atraumatic and normocephalic.  Anicteric.  Pupils are equal, round,


and reactive to light and accommodation.  Extraocular muscles intact.


NECK:  JVP is about 12 to 15 cm.  No carotid bruit.  Carotid upstroke is 2+


bilaterally.


CARDIOVASCULAR:  Normal S1, S2.  Regular rate and rhythm.  A 2/6 mid


systolic murmur at the left sternal border.  PMI is at fourth intercostal


space in the midclavicular.


LUNGS:  Diminished breath sounds in both bases plus crackles.


ABDOMEN:  Soft, nontender, and nondistended.  No hepatosplenomegaly.


Positive bowel sounds.


EXTREMITIES:  No evidence of edema, clubbing, or cyanosis.











Lavon Amador MD Jun 14, 2019 15:22

## 2019-06-14 NOTE — NUR
NURSE NOTES:

Gave report to DEVAN Buck at [facility].  Phone number [phone].

## 2019-06-14 NOTE — PROGRESS NOTE
DATE:  06/13/2019

SUBJECTIVE:  The patient is compliant with medications.  Still has paranoid

ideation.  He is able to answer questions appropriately.  He has tardive

dyskinesia.  He continues to be noncompliant with wound care.



MENTAL STATUS EXAMINATION:  The patient is alert and oriented times self,

place, and situation he is in.  Mood is dysphoric.  Affect is constricted,

congruent with mood.  Thought process is concrete.  Thought content, no

suicidal or homicidal ideation.  _____.  Memory impairment.



ASSESSMENT:  Schizophrenia.



PLAN:

1. Continue _____.

2. Continue with gabapentin.

3. Provide the patient with reality orientation.









  ______________________________________________

  Jerri Jensen M.D.





DR:  CATHLEEN

D:  06/13/2019 22:48

T:  06/14/2019 03:03

JOB#:  [id number]

CC:

## 2019-06-14 NOTE — NUR
NURSE NOTES:  

Received patient from Danny KATE RN.  Patient on room air, no signs of respiratory distress.  
Alert and oriented x2 to self and place.  Edema on bilateral extremity edema.  Patient 
denies pain.  Patient refused cardiac monitor.  Bed is locked in low position, call light 
within reach.

## 2019-06-14 NOTE — NUR
***DISCHARGE PLAN

PATIENT WILL DISCHARGE TO

[facility] [address]

SKILLED

T: [phone] FOR NURSE TO NURSE REPORT

[facility] HAS BEEN ARRANGED FOR 1230 PICK

PATIENT IS IN AGREEMENT WITH DISCHARGE PLAN

## 2019-06-14 NOTE — NEPHROLOGY PROGRESS NOTE
Assessment/Plan


Problem List:  


(1) CHF (congestive heart failure)


Assessment:  cardiomyopathy





(2) Acute encephalopathy


(3) Psychosis


(4) Hypertension


(5) Diabetic nephropathy


Assessment





HTN OOC


Proteinuria / HypoAlbuminemia / Edematous state


Cardiomyopathy : Low Ej Fx


Diabetes ; High A1c


CHF


Renal failure


Anemia


Plan


plan;





Slow diurese


BP control


BS control


Afterload & Preload reduction


Inotropic Rx


monitor lytes and renal parameters


per orders





Subjective


ROS Limited/Unobtainable:  No


Interval Events/Complaints


Seen at 10.30 am- late entery


Constitutional:  Reports: malaise





Objective


Objective





Last 24 Hour Vital Signs








  Date Time  Temp Pulse Resp B/P (MAP) Pulse Ox O2 Delivery O2 Flow Rate FiO2


 


6/14/19 11:58 98.2 68 20 122/79 (93) 94   


 


6/14/19 09:44  64  164/88    


 


6/14/19 09:43  64      


 


6/14/19 09:43    164/88    


 


6/14/19 09:42    164/88    


 


6/14/19 09:38  64 18  98 Room Air  21


 


6/14/19 09:00      Room Air  


 


6/14/19 08:00 98.5 62 20 164/88 (113) 98   


 


6/14/19 05:51    160/96    


 


6/14/19 04:00 98.2 103 24 160/96 (117) 98   


 


6/14/19 01:39 98.0       


 


6/13/19 23:48 98.0 115 20 162/108 (126) 99   


 


6/13/19 23:34  111      


 


6/13/19 23:00    149/99    


 


6/13/19 22:59  113  149/99 (116)    


 


6/13/19 21:00      Room Air  


 


6/13/19 20:30  113  167/116    


 


6/13/19 20:28    167/116    


 


6/13/19 20:00 98.3 113 24 167/116 (133) 96   


 


6/13/19 19:30  102 20  97 Room Air  21


 


6/13/19 19:24  106      


 


6/13/19 18:35    158/83    


 


6/13/19 16:00 98.4 105 20 158/83 (108) 99   

















Intake and Output  


 


 6/13/19 6/14/19





 19:00 07:00


 


Intake Total 280 ml 600 ml


 


Balance 280 ml 600 ml


 


  


 


Intake Oral 280 ml 600 ml


 


# Voids 3 3


 


# Bowel Movements  1








Height (Feet):  5


Height (Inches):  9.00


Weight (Pounds):  182


General Appearance:  no apparent distress


Cardiovascular:  normal rate


Respiratory/Chest:  decreased breath sounds


Abdomen:  distended


Objective


no change











Scott Mccray MD Jun 14, 2019 15:39

## 2019-06-14 NOTE — SURGERY PROGRESS NOTE
Surgery Progress Note


Subjective


Additional Comments


no acute events.  comfortable.  stable.  


labs and micro noted


exam stable 


no complaints





Objective





Last 24 Hour Vital Signs








  Date Time  Temp Pulse Resp B/P (MAP) Pulse Ox O2 Delivery O2 Flow Rate FiO2


 


6/14/19 11:58 98.2 68 20 122/79 (93) 94   


 


6/14/19 09:44  64  164/88    


 


6/14/19 09:43  64      


 


6/14/19 09:43    164/88    


 


6/14/19 09:42    164/88    


 


6/14/19 09:38  64 18  98 Room Air  21


 


6/14/19 09:00      Room Air  


 


6/14/19 08:00 98.5 62 20 164/88 (113) 98   


 


6/14/19 05:51    160/96    


 


6/14/19 04:00 98.2 103 24 160/96 (117) 98   


 


6/14/19 01:39 98.0       


 


6/13/19 23:48 98.0 115 20 162/108 (126) 99   


 


6/13/19 23:34  111      


 


6/13/19 23:00    149/99    


 


6/13/19 22:59  113  149/99 (116)    


 


6/13/19 21:00      Room Air  


 


6/13/19 20:30  113  167/116    


 


6/13/19 20:28    167/116    


 


6/13/19 20:00 98.3 113 24 167/116 (133) 96   


 


6/13/19 19:30  102 20  97 Room Air  21


 


6/13/19 19:24  106      


 


6/13/19 18:35    158/83    


 


6/13/19 16:00 98.4 105 20 158/83 (108) 99   


 


6/13/19 13:22    125/81    








I&O











Intake and Output  


 


 6/13/19 6/14/19





 19:00 07:00


 


Intake Total 280 ml 600 ml


 


Balance 280 ml 600 ml


 


  


 


Intake Oral 280 ml 600 ml


 


# Voids 3 3


 


# Bowel Movements  1








Dressing:  dry


Wound:  clean


Cardiovascular:  RSR


Respiratory:  clear


Abdomen:  soft, flat, non-tender, present bowel sounds


Extremities:  tenderness, cyanosis





Plan


Problems:  


(1) Cellulitis


Assessment & Plan:  58M with concerns for cellulitis of bilateral lower 

extremities.  mild edema.  no abscess. was tender on admission but now 

improved.  +psych history.





no acute surgical intervention necessary


will monitor for progression or improvement


clinical exams


Abx as per ID


skin protectant to both legs


thank you 





(2) CHF (congestive heart failure)


(3) Pleural effusion


(4) Pedal edema


(5) Acute encephalopathy


Assessment & Plan:  PT IS A 58-YEAR-OLD MALE WHO WAS ADMITTED DUE TO CELLULITIS 

OF LOWER EXTREMITIES. 


PER CXR: CHF AND BILATERAL PLEURAL EFFUSIONS.


PT HAS H/O OF DRUG ABUSE, PSYCH(SCHIZOPHRENIA, PSYCHOSIS).





CURRENTLY, PT IS ON COGENTIN AND RISPERDAL. 





PLOF: PT IS HOMELESS.





CURRENT STATUS: PT SEEN AT THE EDGE OF BED. ALERT, ORIENTED X 1 TO 2, 


PT HAS PILL ROLLING TREMOR, RUBBING HIS THUMB AND INDEX FINGER, NOTED WHEN PT 

IS SITTING UPRIGHT. VOICE IS CLEAR, BUT SEEMS HAS MILD DYSARTHRIA. 


PT WAS ABLE TO FOLLOW DIRECTIONS. 





GIVEN PO TRIALS: THIN(CUP), PUREE(TSP) AND CRACKERS X 2





INITIAL IMPRESSION: 


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 


PT IS MISSING ALL UPPER TEETH; AND BOTTOM MOLAR TEETH. 


MILDLY REDUCED 


SLOW BUT GOOD MASTICATION TIME, GOOD ORAL TRANSIT TIME, 


FAIR TO GOOD LARYNGEAL ELEVATION, NO OVERT S/S OF ASPIRATION. 


 





RECOMMENDATIONS: 


1. CONTINUE CARDIAC REGULAR WITH THIN LIQUIDS DIET. 


2. ASPIRATION PRECAUTIONS 


3. SPEECH/LANGUAGE/COGNITION EVAL 


4. CONSIDER MODIFIED BARIUM SWALLOW STUDY 





(6) Hypomagnesemia


(7) Hypocalcemia


(8) Psychosis


(9) Hypertension


(10) Diabetic nephropathy











José Miguel Devine Jun 14, 2019 12:13

## 2019-06-14 NOTE — HEMATOLOGY/ONC PROGRESS NOTE
Assessment/Plan


Assessment/Plan





Assessment and Recs:


# Anemia of iron deficiency due to underlying chronic medical issues, 

multifactorial 


--> Anemia workup has been ordered, rule out gi bleed 


--> No evidence of hemolysis is noted, peripheral smear has been reviewed.


--> Hgb goal >7. Transfuse prn.


--> HGB 10.8--> 11.3


--> Iron IV has been started x 5 doses total


--> Medications have been reviewed


--> low threshold for gi evaluation in case has occult +


--> gi eval as required


# Cellulitis of bilateral lower extremities. mild edema. no abscess. was tender 

on admission but now improved.  +psych history.


--> abx as per id


--> surgical recs prn


--> skin protectant to both legs


# CHF with pedal edema


--> as per cards


# Pleural effusion 


--> diuresis on prn lasix


# Pedal edema





The timing of this note does not necessarily reflect the time of the patient 

was seen.





GREATLY APPRECIATE CONSULTATION.





Subjective


Allergies:  


Coded Allergies:  


     No Known Allergies (Unverified , 2/2/18)


Subjective


6/13: Pt in bed resting, no active s/s cardiac, no respiratory distress


6/14: no overnight events, no acute distress noted.





Objective


Objective





Current Medications








 Medications


  (Trade)  Dose


 Ordered  Sig/Nighat


 Route


 PRN Reason  Start Time


 Stop Time Status Last Admin


Dose Admin


 


 Acetaminophen


  (Tylenol)  650 mg  Q4H  PRN


 ORAL


 Mild Pain/Temp > 100.5  6/11/19 19:07


 7/11/19 19:06  6/12/19 13:41


 


 


 Albuterol/


 Ipratropium


  (Albuterol/


 Ipratropium)  3 ml  Q4H  PRN


 HHN


 Shortness of Breath  6/11/19 19:10


 6/16/19 19:09  6/12/19 11:03


 


 


 Aspirin


  (ASA)  325 mg  DAILY


 ORAL


   6/12/19 15:00


 7/12/19 14:59  6/14/19 09:43


 


 


 Benztropine


 Mesylate


  (Cogentin)  2 mg  BEDTIME


 ORAL


   6/12/19 21:00


 7/12/19 20:59  6/13/19 20:30


 


 


 Carvedilol


  (Coreg)  12.5 mg  EVERY 12  HOURS


 ORAL


   6/14/19 09:00


 7/14/19 08:59  6/14/19 09:44


 


 


 Clonidine HCl


  (Catapres Tab)  0.1 mg  Q4H  PRN


 ORAL


 SBP > 160mmHg  6/13/19 14:45


 7/13/19 14:44   


 


 


 Digoxin


  (Lanoxin)  0.125 mg  DAILY


 ORAL


   6/13/19 09:00


 7/13/19 08:59  6/14/19 09:43


 


 


 Docusate Sodium


  (Colace)  100 mg  THREE TIMES A  DAY


 ORAL


   6/12/19 09:00


 7/12/19 08:59  6/14/19 09:42


 


 


 Enalapril Maleate


  (Vasotec)  5 mg  BID


 ORAL


   6/14/19 18:00


 7/13/19 08:59   


 


 


 Famotidine


  (Pepcid)  20 mg  Q12HR


 ORAL


   6/11/19 21:00


 7/11/19 20:59  6/14/19 09:43


 


 


 Furosemide


  (Lasix)  20 mg  DAILY


 IV


   6/13/19 09:00


 7/13/19 08:59  6/14/19 09:43


 


 


 Gabapentin


  (Neurontin)  100 mg  THREE TIMES A  DAY


 ORAL


   6/12/19 14:45


 7/12/19 14:44  6/14/19 09:43


 


 


 Hydralazine HCl


  (Apresoline)  20 mg  Q8HR


 ORAL


   6/14/19 14:00


 7/12/19 17:59   


 


 


 Isosorbide


 Mononitrate


  (Imdur)  30 mg  DAILY


 ORAL


   6/12/19 14:49


 7/12/19 14:48  6/14/19 09:43


 


 


 Nitroglycerin


  (Ntg)  1 patch  Q24H


 TDERMAL


   6/11/19 20:00


 7/11/19 19:59  6/13/19 20:28


 


 


 Risperidone


  (RisperDAL)  2 mg  QHS


 ORAL


   6/11/19 21:00


 7/11/19 20:59  6/13/19 20:30


 


 


 Spironolactone


  (Aldactone)  25 mg  DAILY


 ORAL


   6/14/19 09:00


 7/14/19 08:59  6/14/19 09:43


 


 


 Tamsulosin HCl


  (Flomax)  0.4 mg  BEDTIME


 ORAL


   6/11/19 21:00


 7/11/19 20:59  6/13/19 20:30


 


 


 Tramadol HCl


  (Ultram)  25 mg  Q6H  PRN


 ORAL


 pain 6 and above  6/11/19 19:07


 6/18/19 19:06  6/14/19 01:09


 











Last 24 Hour Vital Signs








  Date Time  Temp Pulse Resp B/P (MAP) Pulse Ox O2 Delivery O2 Flow Rate FiO2


 


6/14/19 09:44  64  164/88    


 


6/14/19 09:43  64      


 


6/14/19 09:43    164/88    


 


6/14/19 09:42    164/88    


 


6/14/19 09:38  64 18  98 Room Air  21


 


6/14/19 09:00      Room Air  


 


6/14/19 08:00 98.5 62 20 164/88 (113) 98   


 


6/14/19 05:51    160/96    


 


6/14/19 04:00 98.2 103 24 160/96 (117) 98   


 


6/14/19 01:39 98.0       


 


6/13/19 23:48 98.0 115 20 162/108 (126) 99   


 


6/13/19 23:34  111      


 


6/13/19 23:00    149/99    


 


6/13/19 22:59  113  149/99 (116)    


 


6/13/19 21:00      Room Air  


 


6/13/19 20:30  113  167/116    


 


6/13/19 20:28    167/116    


 


6/13/19 20:00 98.3 113 24 167/116 (133) 96   


 


6/13/19 19:30  102 20  97 Room Air  21


 


6/13/19 19:24  106      


 


6/13/19 18:35    158/83    


 


6/13/19 16:00 98.4 105 20 158/83 (108) 99   


 


6/13/19 13:22    125/81    


 


6/13/19 12:00  98      


 


6/13/19 12:00 98.1 108 20 125/81 (96) 93   


 


6/13/19 09:16    146/101    


 


6/13/19 09:16  103      


 


6/13/19 09:15  103  146/101    


 


6/13/19 09:15    146/101    


 


6/13/19 09:00  111      


 


6/13/19 09:00      Room Air  


 


6/13/19 08:05  103 19  96 Room Air  21


 


6/13/19 08:00 97.9 114 19 146/101 (116) 96   


 


6/13/19 05:01    133/77    


 


6/13/19 04:00 97.7 102 19 133/77 (95) 96   


 


6/13/19 00:00 96.6 111 20 139/86 (103) 96   


 


6/12/19 23:34    126/60    


 


6/12/19 21:35  104 20  97 Room Air  21


 


6/12/19 21:27  107  120/71    


 


6/12/19 21:00      Room Air  


 


6/12/19 20:20    120/71    


 


6/12/19 20:00 96.6 107 19 120/71 (87) 96   


 


6/12/19 20:00  102      


 


6/12/19 17:11    131/80    


 


6/12/19 17:11  102      


 


6/12/19 16:00 98.2 102 21 131/80 (97) 98   


 


6/12/19 13:44    132/79    


 


6/12/19 12:00 98.6 111 20 147/123 (131) 99   

















Intake and Output  


 


 6/13/19 6/14/19





 19:00 07:00


 


Intake Total 280 ml 600 ml


 


Balance 280 ml 600 ml


 


  


 


Intake Oral 280 ml 600 ml


 


# Voids 3 3


 


# Bowel Movements  1











Labs








Test


  6/11/19


12:54 6/11/19


13:00 6/11/19


13:05 6/11/19


14:00


 


White Blood Count


  8.7 K/UL


(4.8-10.8) 


  


  


 


 


Red Blood Count


  4.04 M/UL


(4.70-6.10) 


  


  


 


 


Hemoglobin


  12.2 G/DL


(14.2-18.0) 


  


  


 


 


Hematocrit


  37.1 %


(42.0-52.0) 


  


  


 


 


Mean Corpuscular Volume 92 FL (80-99)    


 


Mean Corpuscular Hemoglobin


  30.3 PG


(27.0-31.0) 


  


  


 


 


Mean Corpuscular Hemoglobin


Concent 33.0 G/DL


(32.0-36.0) 


  


  


 


 


Red Cell Distribution Width


  13.6 %


(11.6-14.8) 


  


  


 


 


Platelet Count


  241 K/UL


(150-450) 


  


  


 


 


Mean Platelet Volume


  6.2 FL


(6.5-10.1) 


  


  


 


 


Neutrophils (%) (Auto)


  77.4 %


(45.0-75.0) 


  


  


 


 


Lymphocytes (%) (Auto)


  13.4 %


(20.0-45.0) 


  


  


 


 


Monocytes (%) (Auto)


  7.5 %


(1.0-10.0) 


  


  


 


 


Eosinophils (%) (Auto)


  0.9 %


(0.0-3.0) 


  


  


 


 


Basophils (%) (Auto)


  0.8 %


(0.0-2.0) 


  


  


 


 


Iron Level


  43 ug/dL


() 


  


  


 


 


Total Iron Binding Capacity


  297 ug/dL


(250-450) 


  


  


 


 


Percent Iron Saturation 14 % (15-50)    


 


Unsaturated Iron Binding


  254 ug/dL


(112-346) 


  


  


 


 


Ferritin


  65 NG/ML


(8-388) 


  


  


 


 


Troponin I


  0.060 ng/mL


(0.000-0.056) 


  


  0.065 ng/mL


(0.000-0.056)


 


Pro-B-Type Natriuretic Peptide


  4849 pg/mL


(0-125) 


  


  


 


 


Serum Alcohol < 3 mg/dL    


 


Urine Color  Yellow   


 


Urine Appearance  Clear   


 


Urine pH  5 (4.5-8.0)   


 


Urine Specific Gravity


  


  1.025


(1.005-1.035) 


  


 


 


Urine Protein  3+ (NEGATIVE)   


 


Urine Glucose (UA)


  


  Negative


(NEGATIVE) 


  


 


 


Urine Ketones


  


  Negative


(NEGATIVE) 


  


 


 


Urine Blood  2+ (NEGATIVE)   


 


Urine Nitrite


  


  Negative


(NEGATIVE) 


  


 


 


Urine Bilirubin


  


  Negative


(NEGATIVE) 


  


 


 


Urine Urobilinogen


  


  Normal MG/DL


(0.0-1.0) 


  


 


 


Urine Leukocyte Esterase


  


  Negative


(NEGATIVE) 


  


 


 


Urine RBC


  


  5-10 /HPF (0 -


0) 


  


 


 


Urine WBC


  


  0-2 /HPF (0 -


0) 


  


 


 


Urine Squamous Epithelial


Cells 


  Occasional


/LPF 


  


 


 


Urine Bacteria


  


  Occasional


/HPF (NONE) 


  


 


 


Urine Opiates Screen


  


  Negative


(NEGATIVE) 


  


 


 


Urine Barbiturates Screen


  


  Negative


(NEGATIVE) 


  


 


 


Phencyclidine (PCP) Screen


  


  Negative


(NEGATIVE) 


  


 


 


Urine Amphetamines Screen


  


  Negative


(NEGATIVE) 


  


 


 


Urine Benzodiazepines Screen


  


  Negative


(NEGATIVE) 


  


 


 


Urine Cocaine Screen


  


  Negative


(NEGATIVE) 


  


 


 


Urine Marijuana (THC) Screen


  


  Positive


(NEGATIVE) 


  


 


 


Prothrombin Time


  


  


  10.9 SEC


(9.30-11.50) 


 


 


Prothromb Time International


Ratio 


  


  1.0 (0.9-1.1) 


  


 


 


Activated Partial


Thromboplast Time 


  


  25 SEC (23-33) 


  


 


 


D-Dimer


  


  


  0.93 mg/L FEU


(0.00-0.49) 


 


 


Total Creatine Kinase


  


  


  


  363 U/L


()


 


Test


  6/11/19


14:35 6/12/19


05:25 6/13/19


05:35 


 


 


Sodium Level


  138 MMOL/L


(136-145) 142 MMOL/L


(136-145) 139 MMOL/L


(136-145) 


 


 


Potassium Level


  3.2 MMOL/L


(3.5-5.1) 3.6 MMOL/L


(3.5-5.1) 4.1 MMOL/L


(3.5-5.1) 


 


 


Chloride Level


  105 MMOL/L


() 110 MMOL/L


() 106 MMOL/L


() 


 


 


Carbon Dioxide Level


  25 MMOL/L


(21-32) 24 MMOL/L


(21-32) 24 MMOL/L


(21-32) 


 


 


Anion Gap


  8 mmol/L


(5-15) 9 mmol/L


(5-15) 9 mmol/L


(5-15) 


 


 


Blood Urea Nitrogen


  31 mg/dL


(7-18) 29 mg/dL


(7-18) 25 mg/dL


(7-18) 


 


 


Creatinine


  1.4 MG/DL


(0.55-1.30) 1.4 MG/DL


(0.55-1.30) 1.4 MG/DL


(0.55-1.30) 


 


 


Estimat Glomerular Filtration


Rate > 60 mL/min


(>60) > 60 mL/min


(>60) > 60 mL/min


(>60) 


 


 


Glucose Level


  130 MG/DL


() 125 MG/DL


() 152 MG/DL


() 


 


 


Calcium Level


  8.4 MG/DL


(8.5-10.1) 8.2 MG/DL


(8.5-10.1) 8.3 MG/DL


(8.5-10.1) 


 


 


Total Bilirubin


  0.5 MG/DL


(0.2-1.0) 0.3 MG/DL


(0.2-1.0) 0.2 MG/DL


(0.2-1.0) 


 


 


Aspartate Amino Transf


(AST/SGOT) 49 U/L (15-37) 


  42 U/L (15-37) 


  52 U/L (15-37) 


  


 


 


Alanine Aminotransferase


(ALT/SGPT) 90 U/L (12-78) 


  75 U/L (12-78) 


  76 U/L (12-78) 


  


 


 


Alkaline Phosphatase


  82 U/L


() 65 U/L


() 67 U/L


() 


 


 


Total Protein


  6.0 G/DL


(6.4-8.2) 5.2 G/DL


(6.4-8.2) 5.9 G/DL


(6.4-8.2) 


 


 


Albumin


  3.1 G/DL


(3.4-5.0) 2.6 G/DL


(3.4-5.0) 2.9 G/DL


(3.4-5.0) 


 


 


Globulin 2.9 g/dL  2.6 g/dL  3.0 g/dL  


 


Albumin/Globulin Ratio 1.1 (1.0-2.7)  1.0 (1.0-2.7)  1.0 (1.0-2.7)  


 


White Blood Count


  


  7.6 K/UL


(4.8-10.8) 7.7 K/UL


(4.8-10.8) 


 


 


Red Blood Count


  


  3.68 M/UL


(4.70-6.10) 3.79 M/UL


(4.70-6.10) 


 


 


Hemoglobin


  


  10.8 G/DL


(14.2-18.0) 11.3 G/DL


(14.2-18.0) 


 


 


Hematocrit


  


  33.4 %


(42.0-52.0) 34.4 %


(42.0-52.0) 


 


 


Mean Corpuscular Volume  91 FL (80-99)  91 FL (80-99)  


 


Mean Corpuscular Hemoglobin


  


  29.5 PG


(27.0-31.0) 29.8 PG


(27.0-31.0) 


 


 


Mean Corpuscular Hemoglobin


Concent 


  32.5 G/DL


(32.0-36.0) 32.8 G/DL


(32.0-36.0) 


 


 


Red Cell Distribution Width


  


  13.6 %


(11.6-14.8) 13.4 %


(11.6-14.8) 


 


 


Platelet Count


  


  199 K/UL


(150-450) 206 K/UL


(150-450) 


 


 


Mean Platelet Volume


  


  6.0 FL


(6.5-10.1) 6.9 FL


(6.5-10.1) 


 


 


Neutrophils (%) (Auto)


  


  75.5 %


(45.0-75.0) 79.4 %


(45.0-75.0) 


 


 


Lymphocytes (%) (Auto)


  


  13.9 %


(20.0-45.0) 11.6 %


(20.0-45.0) 


 


 


Monocytes (%) (Auto)


  


  8.2 %


(1.0-10.0) 6.3 %


(1.0-10.0) 


 


 


Eosinophils (%) (Auto)


  


  1.2 %


(0.0-3.0) 1.5 %


(0.0-3.0) 


 


 


Basophils (%) (Auto)


  


  1.2 %


(0.0-2.0) 1.2 %


(0.0-2.0) 


 


 


Hemoglobin A1c


  


  7.0 %


(4.3-6.0) 


  


 


 


Uric Acid


  


  6.9 MG/DL


(2.6-7.2) 6.8 MG/DL


(2.6-7.2) 


 


 


Phosphorus Level


  


  3.2 MG/DL


(2.5-4.9) 2.8 MG/DL


(2.5-4.9) 


 


 


Magnesium Level


  


  1.6 MG/DL


(1.8-2.4) 1.8 MG/DL


(1.8-2.4) 


 


 


Gamma Glutamyl Transpeptidase  80 U/L (5-85)   


 


Ammonia


  


  44 umol/L


(11-32) 


  


 


 


Total Creatine Kinase


  


  387 U/L


() 475 U/L


() 


 


 


Troponin I


  


  0.090 ng/mL


(0.000-0.056) 


  


 


 


C-Reactive Protein,


Quantitative 


  < 0.4 mg/dL


(0.00-0.90) < 0.4 mg/dL


(0.00-0.90) 


 


 


Pro-B-Type Natriuretic Peptide


  


  4113 pg/mL


(0-125) 2205 pg/mL


(0-125) 


 


 


Triglycerides Level


  


  42 MG/DL


() 


  


 


 


Cholesterol Level


  


  112 MG/DL (<


200) 


  


 


 


LDL Cholesterol


  


  73 mg/dL


(<100) 


  


 


 


HDL Cholesterol


  


  30 MG/DL


(40-60) 


  


 


 


Cholesterol/HDL Ratio  3.7 (3.3-4.4)   


 


Vitamin B12 Level


  


  421 PG/ML


(193-986) 


  


 


 


Folate


  


  16.7 NG/ML


(8.6-58.9) 


  


 


 


Thyroid Stimulating Hormone


(TSH) 


  2.555 uiU/mL


(0.358-3.740) 


  


 


 


Digoxin Level


  


  


  < 0.2 NG/ML


(0.5-2.0) 


 








Height (Feet):  5


Height (Inches):  9.00


Weight (Pounds):  182











Reginaldo Amado MD Jun 14, 2019 11:44

## 2019-06-14 NOTE — NUR
P.T Note:

P.T evaluation completed and treatment initiated.Please refer to P.T 

evalaution for current functional status. Skilled P.T service is warranted 

to increase strength, endurance and balance to increase mobility 

independence and safety. Recommend SNF for [place] rehab at [place]

-------------------------------------------------------------------------------

Addendum: 06/14/19 at 1050 by YINKA AGOSTO PT

-------------------------------------------------------------------------------

Amended: Links added.

## 2019-06-15 NOTE — DISCHARGE SUMMARY
Discharge Summary


Discharge Summary


_


DATE OF ADMISSION: 6/11/2019





DATE OF DISCHARGE: 6/14/2019








DISCHARGED BY: Dr Kirk





REASON FOR ADMISSION: 


58 years old male with unknown past medical history was brought by paramedics.


Patient was in the middle of the street screaming. 


Patient was homeless.  


Patient was unable to provide good history, but complained of pain bilateral 

lower extremity.


Pain was rated 10 out of 10 on a scale 1-10 with radiation to the calf.


Patient denied drug or alcohol intake.


Upon evaluation patient was tachycardic, blood pressure was somewhat elevated 

158/75, pulse oximetry was stable on room air. 


Laboratory work-up revealed no leukocytosis, hemoglobin 12.2, hematocrit 37.1.  


Potassium 3.2.  


BUN 31, creatinine 1.4.  


AST 49 ALT 90.  


Troponin   0.06, pro-BNP 4849.


Urine toxicology screen was positive for marijuana.  


Serum alcohol level was less than 3.


Urinalysis revealed +3 protein no evidence of UTI.,  


Chest x-ray revealed CHF and bilateral pleural effusions.  


Venous duplex bilateral lower extremity revealed no evidence of acute DVT.  


In emergency department patient received Lasix along with DuoNeb breathing 

treatment via handheld nebulizer.


Patient subsequently was admitted for further management


 


CONSULTANTS:


cardiologist Dr. Amador


neurologist Dr. Kennedy


nephrologist Dr. Mccray


hematologist/oncologist Dr. Amado


surgery Dr. Devine


psychiatrist 


 


[facility] COURSE: 


Patient admitted to telemetry floor. 


Cardiologist followed. 


Troponin were trending, revealed slight elevation. 


Per cardiologist, slight elevation of troponin level could be secondary to type 

II NSTEMI versus myocarditis.  


Patient was on antiplatelet therapy with aspirin. 


Echocardiogram revealed ejection fraction of 20% with global left ventricular 

hypokinesis with apical akinesis.  


Mild left ventricular hypertrophy.  Medium sized circumferential pleural 

effusion.   


Moderately elevated left atrial pressure grade 2.  


Moderate mitral regurgitation.  Mild to moderate tricuspid regurgitation.  


Right ventricular systolic pressure of 33.  


Patient started on diuresis with close monitoring of volumes and cardiorenal 

parameters.


Preloaded and afterload reduction along with inotropic treatment provided 

according to guidelines. 


Guideline directed medical therapy initiated with beta-blocker, ACE inhibitor, 

digoxin, Aldactone and Lasix.  


Pro BNP trended down from initial 4849 down to 2205.


Antiplatelet therapy with aspirin continued. 


Long-acting nitrate started.  


Blood pressure was managed with the multiple antihypertensive medications 

including beta-blocker, ACE inhibitor, hydralazine.  





Nephrologist followed.  


Renal parameters and electrolytes were closely monitored.  


Electrolytes corrected as needed.  


Hemoglobin   A1c was checked and was 7.0.  Patient denied prior history of 

diabetes


Renal failure was likely due to  diabetic nephropathy.  


Patient presented with proteinuria, hypoalbuminemia ,and edematous state.     


Diabetic diet provided.  


Diabetic teaching provided.  


Further optimization of anti-glycemic regimen will be done at the skilled 

nursing facility.





Neurologist followed.  


Neuro-checks were performed.  


Patient was noncooperative with exam , confused , significantly dysarthric but 

did not exhibit any grossly apparent focal deficit. 


Given hypotension and altered mental status , CT scan was done, which  revealed 

no acute intracranial pathology. 


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 

small left posterior parietal old infarct, lacunar infarct in the left internal 

capsule, left cerebellar hemisphere lacunar infarct.  


Acute encephalopathy was likely secondary to metabolic causes as per  

neurologist.


Lipid panel was stable.  Patient was on antiplatelet therapy with aspirin.





Swallow evaluation revealed questionable degree of oropharyngeal dysphagia.  


Diet provided per speech therapist recommendations;  regular with a thin 

liquids.  


Aspiration precaution maintained.  Recommended to consider modified barium 

swallow study, can be done as outpatient.  





Surgeon seen the patient for leg edema.


No acute surgical intervention was necessarily at this time.


Skin care was provided legs were kept elevated


 


Hematologist followed.  


Patient had evidence of anemia of iron deficiency with ferritin 65, likely due 

to underlying chronic medical issues.


Hemoglobin and hematocrit were closely monitored with   goal to keep hemoglobin 

above 7.


Patient was initiated on IV Venofer while in the hospital.


Prior to discharge hemoglobin 11.3, hematocrit 34.4.


Per hematologist, low threshold for   GI evaluation, unless stool OB positive. 





Psychiatrist followed.


Reality orientation and supportive therapy provided.  


Patient started on risperidone and Cogentin.  


Per psychiatrist, patient was  not at  imminent danger to self or others. 


Patient was working with physical therapy.  


Fall precaution maintained.  





Blood pressure stabilized, pulse oximetry stable on room air.  Pro BNP trended 

down.


Placement was arranged to skilled nursing facility /[facility].


Patient was stable for transfer.








FINAL DIAGNOSES: 


Acute systolic and diastolic congestive heart failure with ejection fraction 30%


Slight elevation of troponin I level,  likely secondary to type II NSTEMI 

versus myocarditis


Hypertension out-of-control-stabilized


Diabetic nephropathy


Renal failure due to diabetic nephropathy 


New onset of diabetes  (hemoglobin A1c 7.1)


Anemia of iron deficiency 


Acute metabolic encephalopathy





DISCHARGE MEDICATIONS:


See Medication Reconciliation list.





DISCHARGE INSTRUCTIONS:


Patient was discharged to the skilled nursing facility. 


Follow up with medical doctor at the facility.








I have been assigned to dictate discharge summary for this account. 


I was not involved in the patient's management.











Alondra Burgess NP Jewel 15, 2019 21:55

## 2019-06-15 NOTE — PROGRESS NOTE
DATE:  06/14/2019

SUBJECTIVE:  The patient is the same.  He has episodes of anxiety,

agitation, disorganized speech and behavior, delusional.



MENTAL STATUS EXAMINATION:  The patient is oriented times self, place.

Mood is irritable.  Affect is constricted.  Congruent mood.  Thought

process is concrete.  Thought content, no suicidal or homicidal

ideations.



ASSESSMENT:  Schizophrenia.



PLAN:  We will continue the Cogentin, risperidone.  Provide the patient

with reality orientation and supportive therapy.









  ______________________________________________

  Jerri Jensen M.D.





DR:  BELINDA

D:  06/14/2019 23:34

T:  06/14/2019 23:50

JOB#:  [id number]

CC:

## 2020-03-16 NOTE — NUR
ED Nurse Note:

Pt brought in by ambulance from street. Pt reports SOB since unknown time, Spo2 
98% at triage, pt VSS. Pt is moaning and incoherant, pt placed in room, pt 
removed pants and is playing in his feces. ERMD aware and at bedside

## 2020-03-16 NOTE — EMERGENCY ROOM REPORT
History of Present Illness


General


Chief Complaint:  Dyspnea/Respdistress


Source:  Patient





Present Illness


HPI


This patient is homeless and lives on the street.  It was raining all this 

evening today.  He is brought in by EMS for all over pain and "lung problems."  

When I asked the patient why he was here he states he wants an aspirin.  When I 

further inquired why he wanted aspirin, he states that he has all over pain.  

He has no other specific complaints.


COVID-19 risk:Travel to affect:  No


Allergies:  


Coded Allergies:  


     No Known Allergies (Unverified , 2/2/18)





Patient History


Past Medical History:  see triage record, psych hx - schizophrenia


Social History:  Reports: alcohol use, drug use


Reviewed Nursing Documentation:  PMH: Agreed; PSxH: Agreed





Nursing Documentation-PMH


Hx Cardiac Problems:  No


Hx Gastrointestinal Problems:  No





Review of Systems


All Other Systems:  negative except mentioned in HPI





Physical Exam





Vital Signs








  Date Time  Temp Pulse Resp B/P (MAP) Pulse Ox O2 Delivery O2 Flow Rate FiO2


 


3/16/20 19:12 99.0 76 18 178/68 (104) 98 Room Air  








Sp02 EP Interpretation:  reviewed, normal


General Appearance:  no apparent distress, alert, GCS 15, non-toxic, other - 

Poor personal hygiene, defecated onself.


Head:  normocephalic, atraumatic


Eyes:  bilateral eye normal inspection, bilateral eye PERRL


ENT:  hearing grossly normal, normal pharynx, no angioedema, normal voice


Neck:  full range of motion, supple/symm/no masses


Respiratory:  chest non-tender, lungs clear, normal breath sounds, no 

respiratory distress, no retraction, no accessory muscle use, speaking full 

sentences


Cardiovascular #1:  regular rate, rhythm, no edema


Gastrointestinal:  normal bowel sounds, non tender, soft, non-distended, no 

guarding, no rebound


Rectal:  deferred


Musculoskeletal:  back normal, normal range of motion, gait/station normal, non-

tender


Neurologic:  alert, motor strength/tone normal, oriented x3, sensory intact, 

responsive, speech normal


Psychiatric:  judgement/insight normal, memory normal, mood/affect normal, no 

suicidal/homicidal ideation





Medical Decision Making


Diagnostic Impression:  


 Primary Impression:  


 Homelessness


 Additional Impression:  


 Psychosis


ER Course


This patient is homeless.  He has no specific complaints in my medical 

screening exam to include physical exam did not identify an emergency medical 

condition.  Patient vital signs are normal and he is well-appearing and 

nontoxic.  He has no dyspnea and his lungs are clear.  I was unable to elicit 

any specific complaints and I suspect that this patient was cold and it had 

poured rain all afternoon and evening today.  At this time I do not identify an 

emergency medical condition.  The patient is discharged and instructed to follow

-up as an outpatient.  He was given the local free clinics and homeless 

resources.





Last Vital Signs








  Date Time  Temp Pulse Resp B/P (MAP) Pulse Ox O2 Delivery O2 Flow Rate FiO2


 


3/16/20 19:33  76 18   Room Air  


 


3/16/20 19:33 99.0   178/68 98   








Status:  improved


Disposition:  HOME, SELF-CARE


Condition:  Improved











Ashley Girard DO Mar 16, 2020 20:17